# Patient Record
Sex: MALE | Race: WHITE | Employment: FULL TIME | URBAN - METROPOLITAN AREA
[De-identification: names, ages, dates, MRNs, and addresses within clinical notes are randomized per-mention and may not be internally consistent; named-entity substitution may affect disease eponyms.]

---

## 2017-01-01 DIAGNOSIS — E78.2 MIXED HYPERLIPIDEMIA: ICD-10-CM

## 2017-01-01 DIAGNOSIS — J30.89 PERENNIAL ALLERGIC RHINITIS: ICD-10-CM

## 2017-01-03 DIAGNOSIS — E11.9 TYPE 2 DIABETES MELLITUS WITHOUT COMPLICATION, WITHOUT LONG-TERM CURRENT USE OF INSULIN (HCC): Primary | ICD-10-CM

## 2017-01-03 RX ORDER — FLUTICASONE PROPIONATE 50 MCG
SPRAY, SUSPENSION (ML) NASAL
Qty: 48 G | Refills: 3 | Status: SHIPPED | OUTPATIENT
Start: 2017-01-03 | End: 2018-01-14 | Stop reason: SDUPTHER

## 2017-01-03 RX ORDER — PRAVASTATIN SODIUM 80 MG/1
TABLET ORAL
Qty: 90 TABLET | Refills: 3 | Status: SHIPPED | OUTPATIENT
Start: 2017-01-03 | End: 2017-12-20 | Stop reason: SDUPTHER

## 2017-02-05 ENCOUNTER — TELEPHONE (OUTPATIENT)
Dept: FAMILY MEDICINE CLINIC | Age: 62
End: 2017-02-05

## 2017-02-19 DIAGNOSIS — E11.9 TYPE 2 DIABETES MELLITUS WITHOUT COMPLICATION (HCC): ICD-10-CM

## 2017-03-10 ENCOUNTER — OFFICE VISIT (OUTPATIENT)
Dept: FAMILY MEDICINE CLINIC | Age: 62
End: 2017-03-10

## 2017-03-10 VITALS
BODY MASS INDEX: 23.91 KG/M2 | HEIGHT: 70 IN | DIASTOLIC BLOOD PRESSURE: 62 MMHG | RESPIRATION RATE: 12 BRPM | WEIGHT: 167 LBS | HEART RATE: 96 BPM | TEMPERATURE: 98.4 F | SYSTOLIC BLOOD PRESSURE: 114 MMHG

## 2017-03-10 DIAGNOSIS — E11.9 TYPE 2 DIABETES MELLITUS WITHOUT COMPLICATION, WITHOUT LONG-TERM CURRENT USE OF INSULIN (HCC): Primary | ICD-10-CM

## 2017-03-10 DIAGNOSIS — E11.9 TYPE 2 DIABETES MELLITUS WITHOUT COMPLICATION, WITHOUT LONG-TERM CURRENT USE OF INSULIN (HCC): ICD-10-CM

## 2017-03-10 DIAGNOSIS — I10 ESSENTIAL HYPERTENSION, BENIGN: ICD-10-CM

## 2017-03-10 DIAGNOSIS — M15.9 PRIMARY OSTEOARTHRITIS INVOLVING MULTIPLE JOINTS: ICD-10-CM

## 2017-03-10 DIAGNOSIS — E78.2 MIXED HYPERLIPIDEMIA: ICD-10-CM

## 2017-03-10 LAB
ALBUMIN SERPL-MCNC: 4.5 G/DL (ref 3.9–4.9)
ALP BLD-CCNC: 53 U/L (ref 35–104)
ALT SERPL-CCNC: 20 U/L (ref 0–41)
ANION GAP SERPL CALCULATED.3IONS-SCNC: 15 MEQ/L (ref 7–13)
AST SERPL-CCNC: 11 U/L (ref 0–40)
BASOPHILS ABSOLUTE: 0.2 K/UL (ref 0–0.2)
BASOPHILS RELATIVE PERCENT: 2.8 %
BILIRUB SERPL-MCNC: 0.4 MG/DL (ref 0–1.2)
BUN BLDV-MCNC: 19 MG/DL (ref 6–20)
CALCIUM SERPL-MCNC: 10 MG/DL (ref 8.6–10.2)
CHLORIDE BLD-SCNC: 93 MEQ/L (ref 98–107)
CHOLESTEROL, TOTAL: 174 MG/DL (ref 0–199)
CO2: 22 MEQ/L (ref 22–29)
CREAT SERPL-MCNC: 0.7 MG/DL (ref 0.7–1.2)
CREATININE URINE: 128.4 MG/DL
EOSINOPHILS ABSOLUTE: 0.3 K/UL (ref 0–0.7)
EOSINOPHILS RELATIVE PERCENT: 5.4 %
GFR AFRICAN AMERICAN: >60
GFR NON-AFRICAN AMERICAN: >60
GLOBULIN: 2.2 G/DL (ref 2.3–3.5)
GLUCOSE BLD-MCNC: 321 MG/DL (ref 74–109)
HBA1C MFR BLD: 10.1 % (ref 4.8–5.9)
HCT VFR BLD CALC: 42.2 % (ref 42–52)
HDLC SERPL-MCNC: 36 MG/DL (ref 40–59)
HEMOGLOBIN: 14.5 G/DL (ref 14–18)
LDL CHOLESTEROL CALCULATED: 96 MG/DL (ref 0–129)
LYMPHOCYTES ABSOLUTE: 1.5 K/UL (ref 1–4.8)
LYMPHOCYTES RELATIVE PERCENT: 28.6 %
MCH RBC QN AUTO: 31.1 PG (ref 27–31.3)
MCHC RBC AUTO-ENTMCNC: 34.2 % (ref 33–37)
MCV RBC AUTO: 90.7 FL (ref 80–100)
MICROALBUMIN UR-MCNC: 2 MG/DL
MICROALBUMIN/CREAT UR-RTO: 15.6 MG/G (ref 0–30)
MONOCYTES ABSOLUTE: 0.4 K/UL (ref 0.2–0.8)
MONOCYTES RELATIVE PERCENT: 7.6 %
NEUTROPHILS ABSOLUTE: 3 K/UL (ref 1.4–6.5)
NEUTROPHILS RELATIVE PERCENT: 55.6 %
PDW BLD-RTO: 12.9 % (ref 11.5–14.5)
PLATELET # BLD: 261 K/UL (ref 130–400)
POTASSIUM SERPL-SCNC: 5.1 MEQ/L (ref 3.5–5.1)
RBC # BLD: 4.66 M/UL (ref 4.7–6.1)
SODIUM BLD-SCNC: 130 MEQ/L (ref 132–144)
TOTAL PROTEIN: 6.7 G/DL (ref 6.4–8.1)
TRIGL SERPL-MCNC: 211 MG/DL (ref 0–200)
WBC # BLD: 5.4 K/UL (ref 4.8–10.8)

## 2017-03-10 PROCEDURE — 99214 OFFICE O/P EST MOD 30 MIN: CPT | Performed by: FAMILY MEDICINE

## 2017-03-10 RX ORDER — LISINOPRIL 20 MG/1
20 TABLET ORAL DAILY
Qty: 90 TABLET | Refills: 3 | Status: SHIPPED | OUTPATIENT
Start: 2017-03-10 | End: 2017-08-14 | Stop reason: SDUPTHER

## 2017-03-10 ASSESSMENT — ENCOUNTER SYMPTOMS
CHEST TIGHTNESS: 0
DIARRHEA: 0
BLOOD IN STOOL: 0
VOMITING: 0
ABDOMINAL PAIN: 0
CONSTIPATION: 0
COUGH: 0
TROUBLE SWALLOWING: 0
NAUSEA: 0
SHORTNESS OF BREATH: 0

## 2017-03-16 ENCOUNTER — OFFICE VISIT (OUTPATIENT)
Dept: FAMILY MEDICINE CLINIC | Age: 62
End: 2017-03-16

## 2017-03-16 VITALS
HEART RATE: 96 BPM | WEIGHT: 167 LBS | BODY MASS INDEX: 23.91 KG/M2 | HEIGHT: 70 IN | TEMPERATURE: 98 F | SYSTOLIC BLOOD PRESSURE: 114 MMHG | DIASTOLIC BLOOD PRESSURE: 66 MMHG | RESPIRATION RATE: 18 BRPM

## 2017-03-16 PROCEDURE — 99213 OFFICE O/P EST LOW 20 MIN: CPT | Performed by: FAMILY MEDICINE

## 2017-03-16 RX ORDER — GLIMEPIRIDE 4 MG/1
4 TABLET ORAL EVERY MORNING
Qty: 90 TABLET | Refills: 3 | Status: SHIPPED | OUTPATIENT
Start: 2017-03-16 | End: 2017-10-30 | Stop reason: SDUPTHER

## 2017-06-06 DIAGNOSIS — E29.1 SECONDARY MALE HYPOGONADISM: ICD-10-CM

## 2017-07-07 DIAGNOSIS — E78.2 MIXED HYPERLIPIDEMIA: Primary | ICD-10-CM

## 2017-07-07 DIAGNOSIS — I10 ESSENTIAL HYPERTENSION, BENIGN: ICD-10-CM

## 2017-07-08 DIAGNOSIS — I10 ESSENTIAL HYPERTENSION, BENIGN: ICD-10-CM

## 2017-07-08 DIAGNOSIS — E78.2 MIXED HYPERLIPIDEMIA: ICD-10-CM

## 2017-07-08 LAB
ALBUMIN SERPL-MCNC: 4.5 G/DL (ref 3.9–4.9)
ALP BLD-CCNC: 50 U/L (ref 35–104)
ALT SERPL-CCNC: 34 U/L (ref 0–41)
ANION GAP SERPL CALCULATED.3IONS-SCNC: 15 MEQ/L (ref 7–13)
AST SERPL-CCNC: 20 U/L (ref 0–40)
BASOPHILS ABSOLUTE: 0.1 K/UL (ref 0–0.2)
BASOPHILS RELATIVE PERCENT: 2.3 %
BILIRUB SERPL-MCNC: 0.3 MG/DL (ref 0–1.2)
BUN BLDV-MCNC: 15 MG/DL (ref 6–20)
CALCIUM SERPL-MCNC: 9.7 MG/DL (ref 8.6–10.2)
CHLORIDE BLD-SCNC: 96 MEQ/L (ref 98–107)
CHOLESTEROL, TOTAL: 161 MG/DL (ref 0–199)
CO2: 24 MEQ/L (ref 22–29)
CREAT SERPL-MCNC: 0.58 MG/DL (ref 0.7–1.2)
EOSINOPHILS ABSOLUTE: 0.3 K/UL (ref 0–0.7)
EOSINOPHILS RELATIVE PERCENT: 6.3 %
GFR AFRICAN AMERICAN: >60
GFR NON-AFRICAN AMERICAN: >60
GLOBULIN: 2.3 G/DL (ref 2.3–3.5)
GLUCOSE BLD-MCNC: 129 MG/DL (ref 74–109)
HBA1C MFR BLD: 8.4 % (ref 4.8–5.9)
HCT VFR BLD CALC: 43.4 % (ref 42–52)
HDLC SERPL-MCNC: 44 MG/DL (ref 40–59)
HEMOGLOBIN: 14.8 G/DL (ref 14–18)
LDL CHOLESTEROL CALCULATED: 91 MG/DL (ref 0–129)
LYMPHOCYTES ABSOLUTE: 1.5 K/UL (ref 1–4.8)
LYMPHOCYTES RELATIVE PERCENT: 34.9 %
MCH RBC QN AUTO: 31 PG (ref 27–31.3)
MCHC RBC AUTO-ENTMCNC: 34 % (ref 33–37)
MCV RBC AUTO: 91.2 FL (ref 80–100)
MONOCYTES ABSOLUTE: 0.6 K/UL (ref 0.2–0.8)
MONOCYTES RELATIVE PERCENT: 13 %
NEUTROPHILS ABSOLUTE: 1.9 K/UL (ref 1.4–6.5)
NEUTROPHILS RELATIVE PERCENT: 43.5 %
PDW BLD-RTO: 13.1 % (ref 11.5–14.5)
PLATELET # BLD: 203 K/UL (ref 130–400)
POTASSIUM SERPL-SCNC: 4.3 MEQ/L (ref 3.5–5.1)
RBC # BLD: 4.76 M/UL (ref 4.7–6.1)
SODIUM BLD-SCNC: 135 MEQ/L (ref 132–144)
TOTAL PROTEIN: 6.8 G/DL (ref 6.4–8.1)
TRIGL SERPL-MCNC: 128 MG/DL (ref 0–200)
WBC # BLD: 4.4 K/UL (ref 4.8–10.8)

## 2017-07-10 ENCOUNTER — OFFICE VISIT (OUTPATIENT)
Dept: FAMILY MEDICINE CLINIC | Age: 62
End: 2017-07-10

## 2017-07-10 VITALS
TEMPERATURE: 98.7 F | HEIGHT: 70 IN | HEART RATE: 84 BPM | SYSTOLIC BLOOD PRESSURE: 114 MMHG | BODY MASS INDEX: 25.34 KG/M2 | DIASTOLIC BLOOD PRESSURE: 64 MMHG | RESPIRATION RATE: 16 BRPM | WEIGHT: 177 LBS

## 2017-07-10 DIAGNOSIS — R53.83 OTHER MALAISE AND FATIGUE: ICD-10-CM

## 2017-07-10 DIAGNOSIS — R53.81 OTHER MALAISE AND FATIGUE: ICD-10-CM

## 2017-07-10 DIAGNOSIS — I10 ESSENTIAL HYPERTENSION, BENIGN: ICD-10-CM

## 2017-07-10 DIAGNOSIS — E78.2 MIXED HYPERLIPIDEMIA: ICD-10-CM

## 2017-07-10 PROCEDURE — 99214 OFFICE O/P EST MOD 30 MIN: CPT | Performed by: FAMILY MEDICINE

## 2017-07-10 PROCEDURE — 96372 THER/PROPH/DIAG INJ SC/IM: CPT | Performed by: FAMILY MEDICINE

## 2017-07-10 RX ORDER — CYANOCOBALAMIN 1000 UG/ML
1000 INJECTION INTRAMUSCULAR; SUBCUTANEOUS ONCE
Status: COMPLETED | OUTPATIENT
Start: 2017-07-10 | End: 2017-07-10

## 2017-07-10 RX ADMIN — CYANOCOBALAMIN 1000 MCG: 1000 INJECTION INTRAMUSCULAR; SUBCUTANEOUS at 18:45

## 2017-07-16 ASSESSMENT — ENCOUNTER SYMPTOMS
CONSTIPATION: 0
COUGH: 0
TROUBLE SWALLOWING: 0
BLOOD IN STOOL: 0
SHORTNESS OF BREATH: 0
CHEST TIGHTNESS: 0
VOMITING: 0
NAUSEA: 0
ABDOMINAL PAIN: 0
DIARRHEA: 0

## 2017-08-12 DIAGNOSIS — I10 ESSENTIAL HYPERTENSION, BENIGN: ICD-10-CM

## 2017-08-14 RX ORDER — LISINOPRIL 20 MG/1
TABLET ORAL
Qty: 90 TABLET | Refills: 3 | OUTPATIENT
Start: 2017-08-14

## 2017-08-14 RX ORDER — LISINOPRIL 20 MG/1
TABLET ORAL
Qty: 90 TABLET | Refills: 3 | Status: SHIPPED | OUTPATIENT
Start: 2017-08-14 | End: 2018-07-10 | Stop reason: SDUPTHER

## 2017-08-14 RX ORDER — HYDROCODONE BITARTRATE AND ACETAMINOPHEN 5; 325 MG/1; MG/1
TABLET ORAL
Refills: 0 | COMMUNITY
Start: 2017-07-20

## 2017-10-31 RX ORDER — GLIMEPIRIDE 4 MG/1
4 TABLET ORAL EVERY MORNING
Qty: 90 TABLET | Refills: 3 | Status: SHIPPED | OUTPATIENT
Start: 2017-10-31 | End: 2017-11-13 | Stop reason: SDUPTHER

## 2017-11-10 DIAGNOSIS — E78.2 MIXED HYPERLIPIDEMIA: Primary | ICD-10-CM

## 2017-11-10 DIAGNOSIS — R53.81 MALAISE AND FATIGUE: ICD-10-CM

## 2017-11-10 DIAGNOSIS — I10 ESSENTIAL HYPERTENSION, BENIGN: ICD-10-CM

## 2017-11-10 DIAGNOSIS — E78.2 MIXED HYPERLIPIDEMIA: ICD-10-CM

## 2017-11-10 DIAGNOSIS — E29.1 SECONDARY MALE HYPOGONADISM: ICD-10-CM

## 2017-11-10 DIAGNOSIS — R53.83 MALAISE AND FATIGUE: ICD-10-CM

## 2017-11-10 LAB
ALBUMIN SERPL-MCNC: 4.8 G/DL (ref 3.9–4.9)
ALP BLD-CCNC: 44 U/L (ref 35–104)
ALT SERPL-CCNC: 27 U/L (ref 0–41)
ANION GAP SERPL CALCULATED.3IONS-SCNC: 22 MEQ/L (ref 7–13)
AST SERPL-CCNC: 16 U/L (ref 0–40)
BASOPHILS ABSOLUTE: 0.1 K/UL (ref 0–0.2)
BASOPHILS RELATIVE PERCENT: 1.4 %
BILIRUB SERPL-MCNC: 0.3 MG/DL (ref 0–1.2)
BUN BLDV-MCNC: 14 MG/DL (ref 8–23)
CALCIUM SERPL-MCNC: 9.6 MG/DL (ref 8.6–10.2)
CHLORIDE BLD-SCNC: 96 MEQ/L (ref 98–107)
CHOLESTEROL, TOTAL: 169 MG/DL (ref 0–199)
CO2: 22 MEQ/L (ref 22–29)
CREAT SERPL-MCNC: 0.83 MG/DL (ref 0.7–1.2)
CREATININE URINE: 368.4 MG/DL
EOSINOPHILS ABSOLUTE: 0.3 K/UL (ref 0–0.7)
EOSINOPHILS RELATIVE PERCENT: 4.1 %
GFR AFRICAN AMERICAN: >60
GFR NON-AFRICAN AMERICAN: >60
GLOBULIN: 2.4 G/DL (ref 2.3–3.5)
GLUCOSE BLD-MCNC: 100 MG/DL (ref 74–109)
HBA1C MFR BLD: 8.8 % (ref 4.8–5.9)
HCT VFR BLD CALC: 43.5 % (ref 42–52)
HDLC SERPL-MCNC: 43 MG/DL (ref 40–59)
HEMOGLOBIN: 14.6 G/DL (ref 14–18)
LDL CHOLESTEROL CALCULATED: 93 MG/DL (ref 0–129)
LYMPHOCYTES ABSOLUTE: 1.9 K/UL (ref 1–4.8)
LYMPHOCYTES RELATIVE PERCENT: 28.1 %
MCH RBC QN AUTO: 31.5 PG (ref 27–31.3)
MCHC RBC AUTO-ENTMCNC: 33.7 % (ref 33–37)
MCV RBC AUTO: 93.6 FL (ref 80–100)
MICROALBUMIN UR-MCNC: 6.5 MG/DL
MICROALBUMIN/CREAT UR-RTO: 17.6 MG/G (ref 0–30)
MONOCYTES ABSOLUTE: 0.6 K/UL (ref 0.2–0.8)
MONOCYTES RELATIVE PERCENT: 9.2 %
NEUTROPHILS ABSOLUTE: 3.9 K/UL (ref 1.4–6.5)
NEUTROPHILS RELATIVE PERCENT: 57.2 %
PDW BLD-RTO: 13.4 % (ref 11.5–14.5)
PLATELET # BLD: 237 K/UL (ref 130–400)
POTASSIUM SERPL-SCNC: 4.6 MEQ/L (ref 3.5–5.1)
RBC # BLD: 4.64 M/UL (ref 4.7–6.1)
SODIUM BLD-SCNC: 140 MEQ/L (ref 132–144)
TOTAL PROTEIN: 7.2 G/DL (ref 6.4–8.1)
TRIGL SERPL-MCNC: 163 MG/DL (ref 0–200)
TSH SERPL DL<=0.05 MIU/L-ACNC: 1.14 UIU/ML (ref 0.27–4.2)
WBC # BLD: 6.8 K/UL (ref 4.8–10.8)

## 2017-11-13 ENCOUNTER — OFFICE VISIT (OUTPATIENT)
Dept: FAMILY MEDICINE CLINIC | Age: 62
End: 2017-11-13

## 2017-11-13 VITALS
HEART RATE: 80 BPM | WEIGHT: 178 LBS | DIASTOLIC BLOOD PRESSURE: 66 MMHG | BODY MASS INDEX: 25.48 KG/M2 | HEIGHT: 70 IN | RESPIRATION RATE: 14 BRPM | SYSTOLIC BLOOD PRESSURE: 124 MMHG | TEMPERATURE: 97.1 F

## 2017-11-13 DIAGNOSIS — I10 ESSENTIAL HYPERTENSION, BENIGN: ICD-10-CM

## 2017-11-13 DIAGNOSIS — E78.2 MIXED HYPERLIPIDEMIA: ICD-10-CM

## 2017-11-13 DIAGNOSIS — E29.1 SECONDARY MALE HYPOGONADISM: ICD-10-CM

## 2017-11-13 LAB — TESTOSTERONE TOTAL-MALE: 241 NG/DL (ref 300–720)

## 2017-11-13 PROCEDURE — 99214 OFFICE O/P EST MOD 30 MIN: CPT | Performed by: FAMILY MEDICINE

## 2017-11-13 RX ORDER — GLIMEPIRIDE 4 MG/1
8 TABLET ORAL EVERY MORNING
Qty: 180 TABLET | Refills: 3 | Status: SHIPPED | OUTPATIENT
Start: 2017-11-13 | End: 2018-03-13 | Stop reason: SDUPTHER

## 2017-11-13 RX ORDER — TESTOSTERONE 30 MG/1.5ML
SOLUTION TOPICAL
Qty: 1 BOTTLE | Refills: 5 | Status: SHIPPED | OUTPATIENT
Start: 2017-11-13 | End: 2018-06-15 | Stop reason: SDUPTHER

## 2017-11-13 ASSESSMENT — PATIENT HEALTH QUESTIONNAIRE - PHQ9
SUM OF ALL RESPONSES TO PHQ9 QUESTIONS 1 & 2: 0
2. FEELING DOWN, DEPRESSED OR HOPELESS: 0
SUM OF ALL RESPONSES TO PHQ QUESTIONS 1-9: 0
1. LITTLE INTEREST OR PLEASURE IN DOING THINGS: 0

## 2017-11-15 ASSESSMENT — ENCOUNTER SYMPTOMS
BLOOD IN STOOL: 0
NAUSEA: 0
SHORTNESS OF BREATH: 0
CHEST TIGHTNESS: 0
ABDOMINAL PAIN: 0
CONSTIPATION: 0
DIARRHEA: 0
TROUBLE SWALLOWING: 0
COUGH: 0
VOMITING: 0

## 2017-11-21 ENCOUNTER — TELEPHONE (OUTPATIENT)
Dept: FAMILY MEDICINE CLINIC | Age: 62
End: 2017-11-21

## 2017-11-28 RX ORDER — BACLOFEN 10 MG/1
TABLET ORAL
Qty: 180 TABLET | Refills: 3 | Status: SHIPPED | OUTPATIENT
Start: 2017-11-28 | End: 2018-07-10 | Stop reason: SDUPTHER

## 2017-11-28 NOTE — TELEPHONE ENCOUNTER
Pharmacy requests refill on medication. Please approve or deny this request.  Last seen by you on 11/13/17.     Future Appointments  Date Time Provider Stanton Jaramillo   3/9/2018 9:00 AM SCHEDULE, LAB [facility] PCP [facility]   3/13/2018 4:15 PM Bib Car  [address]

## 2017-12-20 DIAGNOSIS — E78.2 MIXED HYPERLIPIDEMIA: ICD-10-CM

## 2017-12-20 RX ORDER — PRAVASTATIN SODIUM 80 MG/1
TABLET ORAL
Qty: 90 TABLET | Refills: 3 | Status: SHIPPED | OUTPATIENT
Start: 2017-12-20 | End: 2018-07-10 | Stop reason: SDUPTHER

## 2017-12-20 NOTE — TELEPHONE ENCOUNTER
PHARMACY REQUESTING REFILL  PATIENT LAST SEEN 11/13/17  LAST REFILL 1/3/17  PLEASE APPROVE OR DENY.        Future Appointments  Date Time Provider Stanton Jaramillo   3/9/2018 9:00 AM SCHEDULE, LAB [facility] SIFUENTES PCP [facility]   3/13/2018 4:15 PM Nickie Roldan  [address]

## 2018-01-11 ENCOUNTER — TELEPHONE (OUTPATIENT)
Dept: FAMILY MEDICINE CLINIC | Age: 63
End: 2018-01-11

## 2018-01-14 DIAGNOSIS — J30.89 PERENNIAL ALLERGIC RHINITIS: ICD-10-CM

## 2018-01-14 RX ORDER — FLUTICASONE PROPIONATE 50 MCG
SPRAY, SUSPENSION (ML) NASAL
Qty: 48 G | Refills: 3 | Status: SHIPPED | OUTPATIENT
Start: 2018-01-14 | End: 2018-07-10 | Stop reason: SDUPTHER

## 2018-02-07 DIAGNOSIS — E11.9 TYPE 2 DIABETES MELLITUS WITHOUT COMPLICATION (HCC): ICD-10-CM

## 2018-03-10 DIAGNOSIS — E78.2 MIXED HYPERLIPIDEMIA: ICD-10-CM

## 2018-03-10 DIAGNOSIS — I10 ESSENTIAL HYPERTENSION, BENIGN: ICD-10-CM

## 2018-03-10 DIAGNOSIS — E29.1 SECONDARY MALE HYPOGONADISM: ICD-10-CM

## 2018-03-10 LAB
ALBUMIN SERPL-MCNC: 4.9 G/DL (ref 3.9–4.9)
ALP BLD-CCNC: 50 U/L (ref 35–104)
ALT SERPL-CCNC: 24 U/L (ref 0–41)
ANION GAP SERPL CALCULATED.3IONS-SCNC: 19 MEQ/L (ref 7–13)
AST SERPL-CCNC: 17 U/L (ref 0–40)
BASOPHILS ABSOLUTE: 0.1 K/UL (ref 0–0.2)
BASOPHILS RELATIVE PERCENT: 1.5 %
BILIRUB SERPL-MCNC: 0.4 MG/DL (ref 0–1.2)
BUN BLDV-MCNC: 13 MG/DL (ref 8–23)
CALCIUM SERPL-MCNC: 10.3 MG/DL (ref 8.6–10.2)
CHLORIDE BLD-SCNC: 94 MEQ/L (ref 98–107)
CHOLESTEROL, TOTAL: 161 MG/DL (ref 0–199)
CO2: 24 MEQ/L (ref 22–29)
CREAT SERPL-MCNC: 0.76 MG/DL (ref 0.7–1.2)
EOSINOPHILS ABSOLUTE: 0.4 K/UL (ref 0–0.7)
EOSINOPHILS RELATIVE PERCENT: 6.4 %
GFR AFRICAN AMERICAN: >60
GFR NON-AFRICAN AMERICAN: >60
GLOBULIN: 2.5 G/DL (ref 2.3–3.5)
GLUCOSE BLD-MCNC: 193 MG/DL (ref 74–109)
HBA1C MFR BLD: 11.2 % (ref 4.8–5.9)
HCT VFR BLD CALC: 45.4 % (ref 42–52)
HDLC SERPL-MCNC: 49 MG/DL (ref 40–59)
HEMOGLOBIN: 15.3 G/DL (ref 14–18)
LDL CHOLESTEROL CALCULATED: 89 MG/DL (ref 0–129)
LYMPHOCYTES ABSOLUTE: 1.6 K/UL (ref 1–4.8)
LYMPHOCYTES RELATIVE PERCENT: 28.6 %
MCH RBC QN AUTO: 31.2 PG (ref 27–31.3)
MCHC RBC AUTO-ENTMCNC: 33.6 % (ref 33–37)
MCV RBC AUTO: 92.8 FL (ref 80–100)
MONOCYTES ABSOLUTE: 0.7 K/UL (ref 0.2–0.8)
MONOCYTES RELATIVE PERCENT: 12.6 %
NEUTROPHILS ABSOLUTE: 2.9 K/UL (ref 1.4–6.5)
NEUTROPHILS RELATIVE PERCENT: 50.9 %
PDW BLD-RTO: 13 % (ref 11.5–14.5)
PLATELET # BLD: 232 K/UL (ref 130–400)
POTASSIUM SERPL-SCNC: 4.6 MEQ/L (ref 3.5–5.1)
RBC # BLD: 4.89 M/UL (ref 4.7–6.1)
SODIUM BLD-SCNC: 137 MEQ/L (ref 132–144)
TOTAL PROTEIN: 7.4 G/DL (ref 6.4–8.1)
TRIGL SERPL-MCNC: 113 MG/DL (ref 0–200)
WBC # BLD: 5.7 K/UL (ref 4.8–10.8)

## 2018-03-12 LAB — TESTOSTERONE TOTAL-MALE: 246 NG/DL (ref 300–720)

## 2018-03-13 ENCOUNTER — OFFICE VISIT (OUTPATIENT)
Dept: FAMILY MEDICINE CLINIC | Age: 63
End: 2018-03-13
Payer: COMMERCIAL

## 2018-03-13 VITALS
HEIGHT: 70 IN | RESPIRATION RATE: 12 BRPM | SYSTOLIC BLOOD PRESSURE: 112 MMHG | WEIGHT: 164.4 LBS | HEART RATE: 67 BPM | DIASTOLIC BLOOD PRESSURE: 78 MMHG | TEMPERATURE: 98.3 F | BODY MASS INDEX: 23.54 KG/M2

## 2018-03-13 DIAGNOSIS — I10 ESSENTIAL HYPERTENSION, BENIGN: ICD-10-CM

## 2018-03-13 DIAGNOSIS — E78.2 MIXED HYPERLIPIDEMIA: ICD-10-CM

## 2018-03-13 PROCEDURE — 99214 OFFICE O/P EST MOD 30 MIN: CPT | Performed by: FAMILY MEDICINE

## 2018-03-13 RX ORDER — GLIMEPIRIDE 4 MG/1
8 TABLET ORAL EVERY MORNING
Qty: 180 TABLET | Refills: 3 | Status: SHIPPED | OUTPATIENT
Start: 2018-03-13 | End: 2019-02-10 | Stop reason: SDUPTHER

## 2018-03-13 RX ORDER — GABAPENTIN 300 MG/1
CAPSULE ORAL
Refills: 0 | COMMUNITY
Start: 2018-03-09

## 2018-03-13 ASSESSMENT — ENCOUNTER SYMPTOMS
VOMITING: 0
CONSTIPATION: 0
TROUBLE SWALLOWING: 0
ABDOMINAL PAIN: 0
NAUSEA: 0
SHORTNESS OF BREATH: 0
COUGH: 0
BLOOD IN STOOL: 0
BACK PAIN: 0
CHEST TIGHTNESS: 0
DIARRHEA: 0

## 2018-03-13 NOTE — PROGRESS NOTES
Diabetes Mellitus Type 2: Current symptoms/problems include some neuropathy. Home blood sugar records: patient tests 2 time(s) per week  Any episodes of hypoglycemia? no  Known diabetic complications: none  Hypertension has been well-controlled on low salt diet. Hyperlipidemia is well controlled with current diet and activity. Disc disease of the neck may be candidate for surgery although patient advised to put that off until he can take the pain no longer. Blood pressure less than 131/81,   BP Readings from Last 1 Encounters:   03/13/18 112/78       Social history: This pt is not a smoker. This pt does not take an aspirin a day. Last eye exam was 12/2016  Last diabetic foot exam was 7/2017   Lab results for chronic conditions:    GFR Non- (no units)   Date Value   03/10/2018 >60.0   11/10/2017 >60.0   07/08/2017 >60.0     No results found for: GFR  Cholesterol, Total (mg/dL)   Date Value   03/10/2018 161   11/10/2017 169   07/08/2017 161     Triglycerides (mg/dL)   Date Value   03/10/2018 113   11/10/2017 163   07/08/2017 128     HDL   Date Value   03/10/2018 49 mg/dL   11/10/2017 43 mg/dL   07/08/2017 44 mg/dL   04/19/2011 58 mg/dl     LDL Calculated (mg/dL)   Date Value   03/10/2018 89   11/10/2017 93   07/08/2017 91     TSH (uIU/mL)   Date Value   11/10/2017 1.140     Hemoglobin A1C (%)   Date Value   03/10/2018 11.2 (H)   11/10/2017 8.8 (H)   07/08/2017 8.4 (H)     Microalbumin, Random Urine (mg/dL)   Date Value   11/10/2017 6.50 (H)     Review of Systems   HENT: Negative for congestion and trouble swallowing. Respiratory: Negative for cough, chest tightness and shortness of breath. Cardiovascular: Negative for chest pain, palpitations and leg swelling. Gastrointestinal: Negative for abdominal pain, blood in stool, constipation, diarrhea, nausea and vomiting. Endocrine: Negative for cold intolerance and heat intolerance. Musculoskeletal: Positive for neck pain.  Negative

## 2018-06-15 DIAGNOSIS — E78.2 MIXED HYPERLIPIDEMIA: Primary | ICD-10-CM

## 2018-06-15 DIAGNOSIS — E29.1 SECONDARY MALE HYPOGONADISM: ICD-10-CM

## 2018-06-16 DIAGNOSIS — E78.2 MIXED HYPERLIPIDEMIA: ICD-10-CM

## 2018-06-16 LAB
ALBUMIN SERPL-MCNC: 4.7 G/DL (ref 3.9–4.9)
ALP BLD-CCNC: 48 U/L (ref 35–104)
ALT SERPL-CCNC: 23 U/L (ref 0–41)
ANION GAP SERPL CALCULATED.3IONS-SCNC: 15 MEQ/L (ref 7–13)
AST SERPL-CCNC: 17 U/L (ref 0–40)
BASOPHILS ABSOLUTE: 0.1 K/UL (ref 0–0.2)
BASOPHILS RELATIVE PERCENT: 1.3 %
BILIRUB SERPL-MCNC: 0.3 MG/DL (ref 0–1.2)
BUN BLDV-MCNC: 13 MG/DL (ref 8–23)
CALCIUM SERPL-MCNC: 9.8 MG/DL (ref 8.6–10.2)
CHLORIDE BLD-SCNC: 95 MEQ/L (ref 98–107)
CHOLESTEROL, TOTAL: 160 MG/DL (ref 0–199)
CO2: 26 MEQ/L (ref 22–29)
CREAT SERPL-MCNC: 0.71 MG/DL (ref 0.7–1.2)
EOSINOPHILS ABSOLUTE: 0.4 K/UL (ref 0–0.7)
EOSINOPHILS RELATIVE PERCENT: 6.3 %
GFR AFRICAN AMERICAN: >60
GFR NON-AFRICAN AMERICAN: >60
GLOBULIN: 2.5 G/DL (ref 2.3–3.5)
GLUCOSE BLD-MCNC: 160 MG/DL (ref 74–109)
HBA1C MFR BLD: 10.3 % (ref 4.8–5.9)
HCT VFR BLD CALC: 44.5 % (ref 42–52)
HDLC SERPL-MCNC: 47 MG/DL (ref 40–59)
HEMOGLOBIN: 15.1 G/DL (ref 14–18)
LDL CHOLESTEROL CALCULATED: 84 MG/DL (ref 0–129)
LYMPHOCYTES ABSOLUTE: 1.9 K/UL (ref 1–4.8)
LYMPHOCYTES RELATIVE PERCENT: 32.1 %
MCH RBC QN AUTO: 31.8 PG (ref 27–31.3)
MCHC RBC AUTO-ENTMCNC: 34 % (ref 33–37)
MCV RBC AUTO: 93.6 FL (ref 80–100)
MONOCYTES ABSOLUTE: 0.7 K/UL (ref 0.2–0.8)
MONOCYTES RELATIVE PERCENT: 11.4 %
NEUTROPHILS ABSOLUTE: 2.9 K/UL (ref 1.4–6.5)
NEUTROPHILS RELATIVE PERCENT: 48.9 %
PDW BLD-RTO: 13.5 % (ref 11.5–14.5)
PLATELET # BLD: 262 K/UL (ref 130–400)
POTASSIUM SERPL-SCNC: 4.9 MEQ/L (ref 3.5–5.1)
RBC # BLD: 4.76 M/UL (ref 4.7–6.1)
SODIUM BLD-SCNC: 136 MEQ/L (ref 132–144)
TOTAL PROTEIN: 7.2 G/DL (ref 6.4–8.1)
TRIGL SERPL-MCNC: 143 MG/DL (ref 0–200)
WBC # BLD: 6 K/UL (ref 4.8–10.8)

## 2018-06-18 RX ORDER — TESTOSTERONE 30 MG/1.5ML
SOLUTION TOPICAL
Qty: 1 BOTTLE | Refills: 5 | Status: SHIPPED | OUTPATIENT
Start: 2018-06-18 | End: 2018-10-18 | Stop reason: SDUPTHER

## 2018-07-10 ENCOUNTER — OFFICE VISIT (OUTPATIENT)
Dept: FAMILY MEDICINE CLINIC | Age: 63
End: 2018-07-10
Payer: COMMERCIAL

## 2018-07-10 VITALS
TEMPERATURE: 98.7 F | BODY MASS INDEX: 24.98 KG/M2 | DIASTOLIC BLOOD PRESSURE: 86 MMHG | RESPIRATION RATE: 12 BRPM | SYSTOLIC BLOOD PRESSURE: 122 MMHG | HEIGHT: 70 IN | HEART RATE: 74 BPM | WEIGHT: 174.5 LBS | OXYGEN SATURATION: 94 %

## 2018-07-10 DIAGNOSIS — I10 ESSENTIAL HYPERTENSION, BENIGN: ICD-10-CM

## 2018-07-10 DIAGNOSIS — E78.2 MIXED HYPERLIPIDEMIA: ICD-10-CM

## 2018-07-10 DIAGNOSIS — M15.9 PRIMARY OSTEOARTHRITIS INVOLVING MULTIPLE JOINTS: ICD-10-CM

## 2018-07-10 DIAGNOSIS — J30.89 PERENNIAL ALLERGIC RHINITIS: ICD-10-CM

## 2018-07-10 DIAGNOSIS — E29.1 SECONDARY MALE HYPOGONADISM: ICD-10-CM

## 2018-07-10 PROCEDURE — 99214 OFFICE O/P EST MOD 30 MIN: CPT | Performed by: FAMILY MEDICINE

## 2018-07-10 RX ORDER — PRAVASTATIN SODIUM 80 MG/1
TABLET ORAL
Qty: 90 TABLET | Refills: 3 | Status: SHIPPED | OUTPATIENT
Start: 2018-07-10 | End: 2019-03-06

## 2018-07-10 RX ORDER — FLUTICASONE PROPIONATE 50 MCG
SPRAY, SUSPENSION (ML) NASAL
Qty: 48 G | Refills: 3 | Status: SHIPPED | OUTPATIENT
Start: 2018-07-10 | End: 2019-03-29

## 2018-07-10 RX ORDER — BACLOFEN 10 MG/1
TABLET ORAL
Qty: 180 TABLET | Refills: 3 | Status: SHIPPED | OUTPATIENT
Start: 2018-07-10

## 2018-07-10 RX ORDER — LISINOPRIL 20 MG/1
TABLET ORAL
Qty: 90 TABLET | Refills: 3 | Status: SHIPPED | OUTPATIENT
Start: 2018-07-10 | End: 2018-12-21 | Stop reason: SDUPTHER

## 2018-07-10 ASSESSMENT — PATIENT HEALTH QUESTIONNAIRE - PHQ9
1. LITTLE INTEREST OR PLEASURE IN DOING THINGS: 0
SUM OF ALL RESPONSES TO PHQ QUESTIONS 1-9: 0
SUM OF ALL RESPONSES TO PHQ9 QUESTIONS 1 & 2: 0
2. FEELING DOWN, DEPRESSED OR HOPELESS: 0

## 2018-07-10 NOTE — PROGRESS NOTES
Diabetes Mellitus Type 2: Current symptoms/problems include none and neuropathy. Home blood sugar records: patient tests 2 time(s) per week  Any episodes of hypoglycemia? no  Known diabetic complications: none other than poor control. Hypertension has been well-controlled with current treatments which include lisinopril 20 mg daily. Hyperlipidemia with good numbers on medium intensity statin, pravastatin 80 mg daily. We will need to discuss transitioning to a high intensity statin. Allergic rhinitis is stable with current treatment. Hypogonadism is also stable with current treatment. We will continue topical testosterone. Blood pressure less than 131/81,   BP Readings from Last 1 Encounters:   07/10/18 122/86       Social history: This pt is not a smoker. This pt does not take an aspirin a day. Last eye exam was 12/2016  Last diabetic foot exam was 7/2017   Lab results for chronic conditions:    GFR Non- (no units)   Date Value   06/16/2018 >60.0   03/10/2018 >60.0   11/10/2017 >60.0     No results found for: GFR  Cholesterol, Total (mg/dL)   Date Value   06/16/2018 160   03/10/2018 161   11/10/2017 169     Triglycerides (mg/dL)   Date Value   06/16/2018 143   03/10/2018 113   11/10/2017 163     HDL   Date Value   06/16/2018 47 mg/dL   03/10/2018 49 mg/dL   11/10/2017 43 mg/dL   04/19/2011 58 mg/dl     LDL Calculated (mg/dL)   Date Value   06/16/2018 84   03/10/2018 89   11/10/2017 93     TSH (uIU/mL)   Date Value   11/10/2017 1.140     Hemoglobin A1C (%)   Date Value   06/16/2018 10.3 (H)   03/10/2018 11.2 (H)   11/10/2017 8.8 (H)     Microalbumin, Random Urine (mg/dL)   Date Value   11/10/2017 6.50 (H)       Review of Systems   HENT: Negative for congestion and trouble swallowing. Respiratory: Negative for cough, chest tightness and shortness of breath. Cardiovascular: Negative for chest pain, palpitations and leg swelling.    Gastrointestinal: Negative for abdominal pain, blood in stool, constipation, diarrhea, nausea and vomiting. Endocrine: Negative for cold intolerance and heat intolerance. Neurological: Negative for dizziness and light-headedness. Psychiatric/Behavioral: Negative for confusion. The patient is not nervous/anxious. Diabetes Counseling   Patient was counseled regarding disease risks and adopting healthy behaviors. Patient was provided education materials to assist with self management. Patient was provided log (or received log during previous visit) to record blood pressure, food intake and/or blood sugar. Patient was instructed to keep log up-to-date and to always bring log to all office visits. EXAM:  Constitutional Blood pressure 122/86, pulse 74, temperature 98.7 °F (37.1 °C), temperature source Temporal, resp. rate 12, height 5' 9.5\" (1.765 m), weight 174 lb 8 oz (79.2 kg), SpO2 94 %. Physical Exam   Constitutional: He is oriented to person, place, and time. He appears well-developed and well-nourished. Neck: Normal range of motion. Neck supple. Carotid bruit is not present. Cardiovascular: Normal rate, regular rhythm and normal heart sounds. Pulmonary/Chest: Effort normal and breath sounds normal.   Abdominal: Soft. There is no tenderness. There is no rebound and no guarding. Musculoskeletal:   There is no costovertebral angle tenderness. Lumbar spine and sacroiliac joints are non tender. There is no edema in the four extremities. Pulses palpable at both posterior tibial and radial arteries. Neurological: He is alert and oriented to person, place, and time. DIAGNOSIS:    Diagnosis Orders   1. Uncontrolled type 2 diabetes mellitus without complication, without long-term current use of insulin (HCC)  Hemoglobin A1C    DISCONTINUED: Empagliflozin-Linagliptin 25-5 MG TABS    Poorly controlled, need to add medication and improve diet.    2. Essential hypertension, benign  lisinopril (PRINIVIL;ZESTRIL) 20 MG tablet    CBC Auto Differential    Comprehensive Metabolic Panel    Well controlled, continue current medications. 3. Mixed hyperlipidemia  pravastatin (PRAVACHOL) 80 MG tablet    Lipid Panel    Well controlled, continue current medications. 4. Perennial allergic rhinitis  fluticasone (FLONASE) 50 MCG/ACT nasal spray    Well controlled, continue current medications. 5. Secondary male hypogonadism  Testosterone male    Well controlled, continue same medications. 6. Primary osteoarthritis involving multiple joints  baclofen (LIORESAL) 10 MG tablet    Pain is present and well controlled, patient following with pain management. Plan for follow up: Follow up in 3 months with blood work as ordered. Other follow up as needed.       Electronically signed by Michael Riley, 10:24 PM 7/14/18

## 2018-07-11 ENCOUNTER — TELEPHONE (OUTPATIENT)
Dept: FAMILY MEDICINE CLINIC | Age: 63
End: 2018-07-11

## 2018-07-14 ASSESSMENT — ENCOUNTER SYMPTOMS
TROUBLE SWALLOWING: 0
NAUSEA: 0
CONSTIPATION: 0
COUGH: 0
VOMITING: 0
SHORTNESS OF BREATH: 0
CHEST TIGHTNESS: 0
BLOOD IN STOOL: 0
ABDOMINAL PAIN: 0
DIARRHEA: 0

## 2018-10-06 DIAGNOSIS — E29.1 SECONDARY MALE HYPOGONADISM: ICD-10-CM

## 2018-10-06 DIAGNOSIS — E78.2 MIXED HYPERLIPIDEMIA: ICD-10-CM

## 2018-10-06 DIAGNOSIS — I10 ESSENTIAL HYPERTENSION, BENIGN: ICD-10-CM

## 2018-10-06 LAB
ALBUMIN SERPL-MCNC: 4.5 G/DL (ref 3.9–4.9)
ALP BLD-CCNC: 45 U/L (ref 35–104)
ALT SERPL-CCNC: 19 U/L (ref 0–41)
ANION GAP SERPL CALCULATED.3IONS-SCNC: 15 MEQ/L (ref 7–13)
AST SERPL-CCNC: 15 U/L (ref 0–40)
BASOPHILS ABSOLUTE: 0.1 K/UL (ref 0–0.2)
BASOPHILS RELATIVE PERCENT: 1.9 %
BILIRUB SERPL-MCNC: <0.2 MG/DL (ref 0–1.2)
BUN BLDV-MCNC: 14 MG/DL (ref 8–23)
CALCIUM SERPL-MCNC: 9.7 MG/DL (ref 8.6–10.2)
CHLORIDE BLD-SCNC: 97 MEQ/L (ref 98–107)
CHOLESTEROL, TOTAL: 159 MG/DL (ref 0–199)
CO2: 24 MEQ/L (ref 22–29)
CREAT SERPL-MCNC: 0.84 MG/DL (ref 0.7–1.2)
EOSINOPHILS ABSOLUTE: 0 K/UL (ref 0–0.7)
EOSINOPHILS RELATIVE PERCENT: 0 %
GFR AFRICAN AMERICAN: >60
GFR NON-AFRICAN AMERICAN: >60
GLOBULIN: 2.4 G/DL (ref 2.3–3.5)
GLUCOSE BLD-MCNC: 117 MG/DL (ref 74–109)
HBA1C MFR BLD: 10.7 % (ref 4.8–5.9)
HCT VFR BLD CALC: 43.3 % (ref 42–52)
HDLC SERPL-MCNC: 47 MG/DL (ref 40–59)
HEMOGLOBIN: 14.9 G/DL (ref 14–18)
LDL CHOLESTEROL CALCULATED: 91 MG/DL (ref 0–129)
LYMPHOCYTES ABSOLUTE: 1.3 K/UL (ref 1–4.8)
LYMPHOCYTES RELATIVE PERCENT: 22.9 %
MCH RBC QN AUTO: 32.3 PG (ref 27–31.3)
MCHC RBC AUTO-ENTMCNC: 34.5 % (ref 33–37)
MCV RBC AUTO: 93.7 FL (ref 80–100)
MONOCYTES ABSOLUTE: 0.6 K/UL (ref 0.2–0.8)
MONOCYTES RELATIVE PERCENT: 10.4 %
NEUTROPHILS ABSOLUTE: 3.6 K/UL (ref 1.4–6.5)
NEUTROPHILS RELATIVE PERCENT: 64.8 %
PDW BLD-RTO: 13.4 % (ref 11.5–14.5)
PLATELET # BLD: 208 K/UL (ref 130–400)
POTASSIUM SERPL-SCNC: 4.4 MEQ/L (ref 3.5–5.1)
RBC # BLD: 4.62 M/UL (ref 4.7–6.1)
SODIUM BLD-SCNC: 136 MEQ/L (ref 132–144)
TOTAL PROTEIN: 6.9 G/DL (ref 6.4–8.1)
TRIGL SERPL-MCNC: 107 MG/DL (ref 0–200)
WBC # BLD: 5.5 K/UL (ref 4.8–10.8)

## 2018-10-09 LAB — TESTOSTERONE TOTAL-MALE: 412 NG/DL (ref 300–720)

## 2018-10-18 ENCOUNTER — OFFICE VISIT (OUTPATIENT)
Dept: FAMILY MEDICINE CLINIC | Age: 63
End: 2018-10-18
Payer: COMMERCIAL

## 2018-10-18 VITALS
SYSTOLIC BLOOD PRESSURE: 110 MMHG | HEART RATE: 73 BPM | TEMPERATURE: 97.7 F | BODY MASS INDEX: 27.31 KG/M2 | WEIGHT: 174 LBS | OXYGEN SATURATION: 93 % | HEIGHT: 67 IN | DIASTOLIC BLOOD PRESSURE: 70 MMHG | RESPIRATION RATE: 14 BRPM

## 2018-10-18 DIAGNOSIS — I10 ESSENTIAL HYPERTENSION, BENIGN: ICD-10-CM

## 2018-10-18 DIAGNOSIS — E78.2 MIXED HYPERLIPIDEMIA: ICD-10-CM

## 2018-10-18 DIAGNOSIS — E29.1 SECONDARY MALE HYPOGONADISM: ICD-10-CM

## 2018-10-18 PROCEDURE — 99214 OFFICE O/P EST MOD 30 MIN: CPT | Performed by: FAMILY MEDICINE

## 2018-10-18 RX ORDER — TESTOSTERONE 30 MG/1.5ML
45 SOLUTION TOPICAL DAILY
Qty: 1 BOTTLE | Refills: 5 | Status: SHIPPED | OUTPATIENT
Start: 2018-10-18 | End: 2019-04-16

## 2018-10-18 RX ORDER — PIOGLITAZONEHYDROCHLORIDE 30 MG/1
30 TABLET ORAL DAILY
Qty: 90 TABLET | Refills: 3 | Status: SHIPPED | OUTPATIENT
Start: 2018-10-18 | End: 2018-12-21 | Stop reason: SDUPTHER

## 2018-10-18 ASSESSMENT — PATIENT HEALTH QUESTIONNAIRE - PHQ9
1. LITTLE INTEREST OR PLEASURE IN DOING THINGS: 0
SUM OF ALL RESPONSES TO PHQ9 QUESTIONS 1 & 2: 0
2. FEELING DOWN, DEPRESSED OR HOPELESS: 0
SUM OF ALL RESPONSES TO PHQ QUESTIONS 1-9: 0
SUM OF ALL RESPONSES TO PHQ QUESTIONS 1-9: 0

## 2018-10-23 LAB — DIABETIC RETINOPATHY: NEGATIVE

## 2018-10-27 ASSESSMENT — ENCOUNTER SYMPTOMS
CONSTIPATION: 0
DIARRHEA: 0
VOMITING: 0
COUGH: 0
BLOOD IN STOOL: 0
NAUSEA: 0
CHEST TIGHTNESS: 0
ABDOMINAL PAIN: 0
SHORTNESS OF BREATH: 0

## 2018-11-26 ENCOUNTER — OFFICE VISIT (OUTPATIENT)
Dept: FAMILY MEDICINE CLINIC | Age: 63
End: 2018-11-26
Payer: COMMERCIAL

## 2018-11-26 VITALS
HEIGHT: 67 IN | OXYGEN SATURATION: 95 % | DIASTOLIC BLOOD PRESSURE: 70 MMHG | TEMPERATURE: 97.7 F | HEART RATE: 78 BPM | WEIGHT: 174.3 LBS | RESPIRATION RATE: 14 BRPM | SYSTOLIC BLOOD PRESSURE: 122 MMHG | BODY MASS INDEX: 27.36 KG/M2

## 2018-11-26 DIAGNOSIS — M50.90 CERVICAL DISC DISEASE: Primary | ICD-10-CM

## 2018-11-26 DIAGNOSIS — E78.2 MIXED HYPERLIPIDEMIA: ICD-10-CM

## 2018-11-26 DIAGNOSIS — I10 ESSENTIAL HYPERTENSION, BENIGN: ICD-10-CM

## 2018-11-26 DIAGNOSIS — E29.1 SECONDARY MALE HYPOGONADISM: ICD-10-CM

## 2018-11-26 DIAGNOSIS — M15.9 PRIMARY OSTEOARTHRITIS INVOLVING MULTIPLE JOINTS: ICD-10-CM

## 2018-11-26 DIAGNOSIS — Z01.818 PREOPERATIVE CLEARANCE: ICD-10-CM

## 2018-11-26 LAB
ALBUMIN SERPL-MCNC: 4.7 G/DL (ref 3.9–4.9)
ALP BLD-CCNC: 38 U/L (ref 35–104)
ALT SERPL-CCNC: 22 U/L (ref 0–41)
ANION GAP SERPL CALCULATED.3IONS-SCNC: 18 MEQ/L (ref 7–13)
APTT: 26.7 SEC (ref 21.6–35.4)
AST SERPL-CCNC: 16 U/L (ref 0–40)
BASOPHILS ABSOLUTE: 0.1 K/UL (ref 0–0.2)
BASOPHILS RELATIVE PERCENT: 1.3 %
BILIRUB SERPL-MCNC: 0.3 MG/DL (ref 0–1.2)
BILIRUBIN URINE: NEGATIVE
BLOOD, URINE: NEGATIVE
BUN BLDV-MCNC: 19 MG/DL (ref 8–23)
CALCIUM SERPL-MCNC: 9.8 MG/DL (ref 8.6–10.2)
CHLORIDE BLD-SCNC: 93 MEQ/L (ref 98–107)
CLARITY: CLEAR
CO2: 23 MEQ/L (ref 22–29)
COLOR: YELLOW
CREAT SERPL-MCNC: 0.74 MG/DL (ref 0.7–1.2)
EOSINOPHILS ABSOLUTE: 0 K/UL (ref 0–0.7)
EOSINOPHILS RELATIVE PERCENT: 0 %
GFR AFRICAN AMERICAN: >60
GFR NON-AFRICAN AMERICAN: >60
GLOBULIN: 2.8 G/DL (ref 2.3–3.5)
GLUCOSE BLD-MCNC: 214 MG/DL (ref 74–109)
GLUCOSE URINE: >=1000 MG/DL
HBA1C MFR BLD: 9.8 % (ref 4.8–5.9)
HCT VFR BLD CALC: 42.5 % (ref 42–52)
HEMOGLOBIN: 14.6 G/DL (ref 14–18)
INR BLD: 1
KETONES, URINE: ABNORMAL MG/DL
LEUKOCYTE ESTERASE, URINE: NEGATIVE
LYMPHOCYTES ABSOLUTE: 1.6 K/UL (ref 1–4.8)
LYMPHOCYTES RELATIVE PERCENT: 29.6 %
MCH RBC QN AUTO: 31.8 PG (ref 27–31.3)
MCHC RBC AUTO-ENTMCNC: 34.3 % (ref 33–37)
MCV RBC AUTO: 92.6 FL (ref 80–100)
MONOCYTES ABSOLUTE: 0.6 K/UL (ref 0.2–0.8)
MONOCYTES RELATIVE PERCENT: 11.1 %
NEUTROPHILS ABSOLUTE: 3.1 K/UL (ref 1.4–6.5)
NEUTROPHILS RELATIVE PERCENT: 58 %
NITRITE, URINE: NEGATIVE
PDW BLD-RTO: 13.2 % (ref 11.5–14.5)
PH UA: 5 (ref 5–9)
PLATELET # BLD: 206 K/UL (ref 130–400)
POTASSIUM SERPL-SCNC: 4.6 MEQ/L (ref 3.5–5.1)
PROTEIN UA: NEGATIVE MG/DL
PROTHROMBIN TIME: 10 SEC (ref 9.6–12.3)
RBC # BLD: 4.59 M/UL (ref 4.7–6.1)
SODIUM BLD-SCNC: 134 MEQ/L (ref 132–144)
SPECIFIC GRAVITY UA: 1.04 (ref 1–1.03)
TOTAL PROTEIN: 7.5 G/DL (ref 6.4–8.1)
UROBILINOGEN, URINE: 1 E.U./DL
WBC # BLD: 5.3 K/UL (ref 4.8–10.8)

## 2018-11-26 PROCEDURE — 93000 ELECTROCARDIOGRAM COMPLETE: CPT | Performed by: FAMILY MEDICINE

## 2018-11-26 PROCEDURE — 99243 OFF/OP CNSLTJ NEW/EST LOW 30: CPT | Performed by: FAMILY MEDICINE

## 2018-11-26 ASSESSMENT — PATIENT HEALTH QUESTIONNAIRE - PHQ9
SUM OF ALL RESPONSES TO PHQ QUESTIONS 1-9: 0
1. LITTLE INTEREST OR PLEASURE IN DOING THINGS: 0
2. FEELING DOWN, DEPRESSED OR HOPELESS: 0
SUM OF ALL RESPONSES TO PHQ QUESTIONS 1-9: 0
SUM OF ALL RESPONSES TO PHQ9 QUESTIONS 1 & 2: 0

## 2018-11-26 NOTE — PROGRESS NOTES
(PRAVACHOL) 80 MG tablet TAKE 1 TABLET DAILY 90 tablet 3    baclofen (LIORESAL) 10 MG tablet TAKE 1 TABLET TWICE A DAY  AS NEEDED 180 tablet 3    gabapentin (NEURONTIN) 300 MG capsule TK ONE C PO TID UTD  0    glimepiride (AMARYL) 4 MG tablet Take 2 tablets by mouth every morning 180 tablet 3    metFORMIN (GLUCOPHAGE) 1000 MG tablet TAKE 1 TABLET TWICE A DAY  WITH MEALS 180 tablet 3    HYDROcodone-acetaminophen (NORCO) 5-325 MG per tablet TK 1 T PO QD PRF PAIN  0    Diclofenac Sodium  MG TB24 TK 1 T PO QD WF  5    glucose blood VI test strips (ONE TOUCH ULTRA TEST) strip Test three times daily  Dx: E11.65 300 each 3    ONETOUCH DELICA LANCETS FINE MISC Test three times daily DX: E11.65 300 each 3    cetirizine ([employer] INDOOR/OUTDOOR ALLERGY RLF) 10 MG tablet Take 1 tablet by mouth daily 90 tablet 3    Blood Glucose Monitoring Suppl (ONE TOUCH ULTRA 2) W/DEVICE KIT Test once daily prn 1 kit 0    penciclovir (DENAVIR) 1 % cream Apply every 3 hours to affected skin 4 times daily 12 g 5     Current Facility-Administered Medications   Medication Dose Route Frequency Provider Last Rate Last Dose    testosterone cypionate (DEPOTESTOTERONE CYPIONATE) injection 50 mg  50 mg Intramuscular Once Vignesh Frye MD         No Known Allergies    Social History     Social History    Marital status:      Spouse name: N/A    Number of children: N/A    Years of education: N/A     Social History Main Topics    Smoking status: Former Smoker     Packs/day: 0.50     Years: 24.00     Quit date: 9/21/1993    Smokeless tobacco: Never Used    Alcohol use Yes      Comment: rarely    Drug use: Unknown    Sexual activity: Not Asked     Other Topics Concern    None     Social History Narrative    None     Family History   Problem Relation Age of Onset    Cancer Mother         colon    Other Mother         benign brain tumor    Cancer Father         bladder/lung       Review of Systems - REVIEW OF SYSTEMS: four extremities. Pulses palpable at both posterior tibial and radial arteries. Neurological: He is alert and oriented to person, place, and time. Skin: Skin is warm and dry. No rash noted. Psychiatric: He has a normal mood and affect. His behavior is normal.     The Ekg interpretation:     normal sinus rhythm with a rate of 82  Axis is   Normal  QTc is  normal  Intervals and Durations are unremarkable. No specific ST-T wave changes appreciated. No evidence of acute ischemia. No significant change from prior EKG dated 2/17/17. DIAGNOSIS:    Diagnosis Orders   1. Cervical disc disease      Symptomatic and failing all conservative treatments, surgery necessary. 2. Preoperative clearance  EKG 12 lead unit performed    APTT    Comprehensive Metabolic Panel    CBC Auto Differential    Protime-INR    Urinalysis    Acceptable low risk for proposed surgery   3. Uncontrolled diabetes mellitus type 2 without complications ([address]  Hemoglobin A1C    Poorly controlled, adjust treatment. Increase pioglitazone 30 mg to 1-1/2 tablets daily. Check A1c with pre-op lab work. 4. Mixed hyperlipidemia      Well controlled, continue current treatment. 5. Essential hypertension      Well controlled, continue current treatment. 6. Primary osteoarthritis involving multiple joints      Stable, continue current treatment, evaluate hips after recovery from neck. 7. Secondary male hypogonadism      Well controlled, continue current treatment. Plan for follow up: Follow up in early postoperative period with blood work as ordered today. Other follow up as needed.       Electronically signed by Sadie Nj, 9:33 AM 11/26/18

## 2018-11-28 ENCOUNTER — TELEPHONE (OUTPATIENT)
Dept: FAMILY MEDICINE CLINIC | Age: 63
End: 2018-11-28

## 2018-12-18 LAB — HBA1C MFR BLD: 10 % (ref 4–6)

## 2018-12-21 ENCOUNTER — OFFICE VISIT (OUTPATIENT)
Dept: FAMILY MEDICINE CLINIC | Age: 63
End: 2018-12-21
Payer: COMMERCIAL

## 2018-12-21 VITALS
HEIGHT: 69 IN | BODY MASS INDEX: 27.55 KG/M2 | SYSTOLIC BLOOD PRESSURE: 134 MMHG | WEIGHT: 186 LBS | TEMPERATURE: 98.2 F | DIASTOLIC BLOOD PRESSURE: 82 MMHG | RESPIRATION RATE: 14 BRPM | OXYGEN SATURATION: 96 % | HEART RATE: 91 BPM

## 2018-12-21 DIAGNOSIS — E11.9 TYPE 2 DIABETES MELLITUS WITHOUT COMPLICATION, WITHOUT LONG-TERM CURRENT USE OF INSULIN (HCC): ICD-10-CM

## 2018-12-21 DIAGNOSIS — I10 ESSENTIAL HYPERTENSION, BENIGN: ICD-10-CM

## 2018-12-21 PROCEDURE — 99213 OFFICE O/P EST LOW 20 MIN: CPT | Performed by: FAMILY MEDICINE

## 2018-12-21 RX ORDER — LISINOPRIL 20 MG/1
TABLET ORAL
Qty: 90 TABLET | Refills: 3 | Status: SHIPPED | OUTPATIENT
Start: 2018-12-21

## 2018-12-21 RX ORDER — PIOGLITAZONEHYDROCHLORIDE 45 MG/1
45 TABLET ORAL DAILY
Qty: 90 TABLET | Refills: 3 | Status: SHIPPED | OUTPATIENT
Start: 2018-12-21

## 2018-12-21 ASSESSMENT — PATIENT HEALTH QUESTIONNAIRE - PHQ9
1. LITTLE INTEREST OR PLEASURE IN DOING THINGS: 0
2. FEELING DOWN, DEPRESSED OR HOPELESS: 0
SUM OF ALL RESPONSES TO PHQ QUESTIONS 1-9: 0
SUM OF ALL RESPONSES TO PHQ QUESTIONS 1-9: 0
SUM OF ALL RESPONSES TO PHQ9 QUESTIONS 1 & 2: 0

## 2018-12-21 NOTE — PROGRESS NOTES
Diabetes Mellitus Type 2: Current symptoms/problems include none. Home blood sugar records: fasting range: 130-143  Any episodes of hypoglycemia? no  Known diabetic complications: none. The patient had neck surgery and was given a very small amount of insulin. In the hospital they checked his A1c and was 10.0%. Review of Systems   Endocrine: Negative for polydipsia and polyphagia. Diabetes Counseling   Patient was counseled regarding disease risks and adopting healthy behaviors. Patient was provided education materials to assist with self management. Patient was provided log (or received log during previous visit) to record blood pressure, food intake and/or blood sugar. Patient was instructed to keep log up-to-date and to always bring log to all office visits. EXAM:  Constitutional Blood pressure 134/82, pulse 91, temperature 98.2 °F (36.8 °C), temperature source Temporal, resp. rate 14, height 5' 9\" (1.753 m), weight 186 lb (84.4 kg), SpO2 96 %. Physical Exam   Constitutional: He is oriented to person, place, and time. He appears well-developed and well-nourished. Neck:   Surgical scars appear well healing, patient is wearing a brace to immobilize the night. Cardiovascular: Normal rate, regular rhythm and normal heart sounds. Pulmonary/Chest: Effort normal and breath sounds normal.   Neurological: He is oriented to person, place, and time. DIAGNOSIS:    Diagnosis Orders   1. Essential hypertension, benign  lisinopril (PRINIVIL;ZESTRIL) 20 MG tablet    Well controlled, continue current medications. 2. Uncontrolled diabetes mellitus type 2 without complications (HCC)  pioglitazone (ACTOS) 45 MG tablet    Poor control, add pioglitazone and improved diet. 3. Type 2 diabetes mellitus without complication, without long-term current use of insulin (HCC)  metFORMIN (GLUCOPHAGE) 1000 MG tablet     Plan for follow up:  Follow up in 2 months with blood work to include only a point of care test hemoglobin A1c on arrival.  Other follow up as needed.       Electronically signed by Christianne Gee, 10:53 AM 12/21/18

## 2018-12-27 ENCOUNTER — TELEPHONE (OUTPATIENT)
Dept: FAMILY MEDICINE CLINIC | Age: 63
End: 2018-12-27

## 2018-12-27 NOTE — TELEPHONE ENCOUNTER
PT CALLING STATES THAT HE HAD CT SCAN 12/24/18 WHILE IN THE HOSPITAL. HE HAS SINCE BEEN OFF METFORMIN. PT WANTS TO KNOW WHEN HE SHOULD RESTART METFORMIN.      PLEASE ADVISE

## 2019-01-04 ENCOUNTER — OFFICE VISIT (OUTPATIENT)
Dept: FAMILY MEDICINE CLINIC | Age: 64
End: 2019-01-04
Payer: COMMERCIAL

## 2019-01-04 VITALS
HEART RATE: 103 BPM | SYSTOLIC BLOOD PRESSURE: 102 MMHG | OXYGEN SATURATION: 94 % | BODY MASS INDEX: 20.83 KG/M2 | TEMPERATURE: 97.5 F | DIASTOLIC BLOOD PRESSURE: 76 MMHG | HEIGHT: 69 IN | WEIGHT: 140.6 LBS | RESPIRATION RATE: 16 BRPM

## 2019-01-04 DIAGNOSIS — M50.00 CERVICAL DISC DISEASE WITH MYELOPATHY: ICD-10-CM

## 2019-01-04 DIAGNOSIS — I10 ESSENTIAL HYPERTENSION, BENIGN: ICD-10-CM

## 2019-01-04 DIAGNOSIS — E29.1 SECONDARY MALE HYPOGONADISM: ICD-10-CM

## 2019-01-04 DIAGNOSIS — E78.2 MIXED HYPERLIPIDEMIA: ICD-10-CM

## 2019-01-04 DIAGNOSIS — R22.1 THROAT SWELLING: Primary | ICD-10-CM

## 2019-01-04 PROCEDURE — 99495 TRANSJ CARE MGMT MOD F2F 14D: CPT | Performed by: FAMILY MEDICINE

## 2019-01-04 PROCEDURE — 1111F DSCHRG MED/CURRENT MED MERGE: CPT | Performed by: FAMILY MEDICINE

## 2019-01-04 ASSESSMENT — PATIENT HEALTH QUESTIONNAIRE - PHQ9
1. LITTLE INTEREST OR PLEASURE IN DOING THINGS: 0
2. FEELING DOWN, DEPRESSED OR HOPELESS: 0
SUM OF ALL RESPONSES TO PHQ9 QUESTIONS 1 & 2: 0
SUM OF ALL RESPONSES TO PHQ QUESTIONS 1-9: 0
SUM OF ALL RESPONSES TO PHQ QUESTIONS 1-9: 0

## 2019-01-14 DIAGNOSIS — E11.9 TYPE 2 DIABETES MELLITUS WITHOUT COMPLICATION (HCC): ICD-10-CM

## 2019-01-14 RX ORDER — SITAGLIPTIN 100 MG/1
TABLET, FILM COATED ORAL
Qty: 90 TABLET | Refills: 3 | OUTPATIENT
Start: 2019-01-14

## 2019-01-25 RX ORDER — GLIMEPIRIDE 4 MG/1
TABLET ORAL
Qty: 180 TABLET | Refills: 3 | OUTPATIENT
Start: 2019-01-25

## 2019-01-29 ENCOUNTER — TELEPHONE (OUTPATIENT)
Dept: FAMILY MEDICINE CLINIC | Age: 64
End: 2019-01-29

## 2019-02-01 ENCOUNTER — TELEPHONE (OUTPATIENT)
Dept: FAMILY MEDICINE CLINIC | Age: 64
End: 2019-02-01

## 2019-02-01 NOTE — TELEPHONE ENCOUNTER
From: Jason Taylor  To: Kojo Dueñas MD  Sent: 2/1/2019 5:16 PM EST  Subject: Medication Renewal Request    Hi,    I spoke to [facility]. The [facility] representative indicated that because you said that I do not use an insulin pump, they denied the request. Obviously, I do not use an insulin pump. .. I have not been able to check my blood sugar for 2 weeks. I had a spinal fusion in December. This is a major problem. Please determine what should be done to resolve this problem. Thank you,  BELLE Rios  ----- Message -----  From: Tawnya Bean  Sent: 1/25/2019 8:16 AM EST  To: Tri Arevalo [place]  Subject: RE: Medication Renewal Request  Adrianne Sargent,    I apologize for the delay. We had not received the fax from [facility] to begin a prior authorization. I contacted them today and they are faxing the information so we can process your request.    Thank you,  Nathen Kidd     ----- Message -----   From: Tri Rios   Sent: 1/24/2019 6:54 PM EST   To: Remedios Mason MD  Subject: Medication Renewal Request    Tri Baltazar. Charlette Rios would like a refill of the following medications:     blood glucose test strips (ONE TOUCH ULTRA TEST) strip Remedios Mason MD]   Patient Comment: [facility] called you for prior authorization of my test strips. I have been out of test strips for a week. This is a problem given the fact that I just had a spinal fusion. Blood sugar impacts healing. Please contact [facility] to authorize my test strips.     Preferred pharmacy: [facility] [address] [place] [address]

## 2019-02-03 NOTE — TELEPHONE ENCOUNTER
The prescription for the test strips is printed if you could please sign it and then/urinary troubles and fax that to the pharmacy will see if we can get him doing the testing twice a day. If this does not work we'll have to test once a day.

## 2019-02-06 RX ORDER — BLOOD-GLUCOSE METER
EACH MISCELLANEOUS
Qty: 1 KIT | Refills: 0 | Status: SHIPPED | OUTPATIENT
Start: 2019-02-06

## 2019-02-06 RX ORDER — LANCETS 30 GAUGE
EACH MISCELLANEOUS
Qty: 200 EACH | Refills: 3 | Status: SHIPPED | OUTPATIENT
Start: 2019-02-06

## 2019-02-11 RX ORDER — GLIMEPIRIDE 4 MG/1
TABLET ORAL
Qty: 180 TABLET | Refills: 3 | Status: SHIPPED | OUTPATIENT
Start: 2019-02-11

## 2019-02-25 ENCOUNTER — OFFICE VISIT (OUTPATIENT)
Dept: FAMILY MEDICINE CLINIC | Age: 64
End: 2019-02-25
Payer: COMMERCIAL

## 2019-02-25 VITALS
RESPIRATION RATE: 14 BRPM | TEMPERATURE: 97.6 F | DIASTOLIC BLOOD PRESSURE: 82 MMHG | SYSTOLIC BLOOD PRESSURE: 104 MMHG | HEART RATE: 85 BPM | BODY MASS INDEX: 22.01 KG/M2 | HEIGHT: 69 IN | OXYGEN SATURATION: 95 % | WEIGHT: 148.6 LBS

## 2019-02-25 DIAGNOSIS — E29.1 HYPOGONADISM, MALE: ICD-10-CM

## 2019-02-25 DIAGNOSIS — E78.2 MIXED HYPERLIPIDEMIA: ICD-10-CM

## 2019-02-25 DIAGNOSIS — E11.9 TYPE 2 DIABETES MELLITUS WITHOUT COMPLICATION, WITHOUT LONG-TERM CURRENT USE OF INSULIN (HCC): Primary | ICD-10-CM

## 2019-02-25 DIAGNOSIS — I10 ESSENTIAL HYPERTENSION, BENIGN: ICD-10-CM

## 2019-02-25 LAB — HBA1C MFR BLD: 6.5 %

## 2019-02-25 PROCEDURE — 83036 HEMOGLOBIN GLYCOSYLATED A1C: CPT | Performed by: FAMILY MEDICINE

## 2019-02-25 PROCEDURE — 99214 OFFICE O/P EST MOD 30 MIN: CPT | Performed by: FAMILY MEDICINE

## 2019-02-25 ASSESSMENT — PATIENT HEALTH QUESTIONNAIRE - PHQ9
SUM OF ALL RESPONSES TO PHQ9 QUESTIONS 1 & 2: 0
SUM OF ALL RESPONSES TO PHQ QUESTIONS 1-9: 0
SUM OF ALL RESPONSES TO PHQ QUESTIONS 1-9: 0
1. LITTLE INTEREST OR PLEASURE IN DOING THINGS: 0
2. FEELING DOWN, DEPRESSED OR HOPELESS: 0

## 2019-03-02 ASSESSMENT — ENCOUNTER SYMPTOMS
NAUSEA: 0
DIARRHEA: 0
BLOOD IN STOOL: 0
VOMITING: 0
CONSTIPATION: 0
SHORTNESS OF BREATH: 0
ABDOMINAL PAIN: 0
COUGH: 0
CHEST TIGHTNESS: 0

## 2019-03-05 DIAGNOSIS — E78.2 MIXED HYPERLIPIDEMIA: ICD-10-CM

## 2019-03-06 RX ORDER — PRAVASTATIN SODIUM 80 MG/1
TABLET ORAL
Qty: 90 TABLET | Refills: 3 | Status: SHIPPED | OUTPATIENT
Start: 2019-03-06

## 2019-03-29 DIAGNOSIS — J30.89 PERENNIAL ALLERGIC RHINITIS: ICD-10-CM

## 2019-03-29 RX ORDER — FLUTICASONE PROPIONATE 50 MCG
SPRAY, SUSPENSION (ML) NASAL
Qty: 48 G | Refills: 3 | Status: SHIPPED | OUTPATIENT
Start: 2019-03-29

## 2023-03-02 LAB
ALANINE AMINOTRANSFERASE (SGPT) (U/L) IN SER/PLAS: 15 U/L (ref 10–52)
ALBUMIN (G/DL) IN SER/PLAS: 4.9 G/DL (ref 3.4–5)
ALKALINE PHOSPHATASE (U/L) IN SER/PLAS: 41 U/L (ref 33–136)
ANION GAP IN SER/PLAS: 13 MMOL/L (ref 10–20)
ASPARTATE AMINOTRANSFERASE (SGOT) (U/L) IN SER/PLAS: 18 U/L (ref 9–39)
BASOPHILS (10*3/UL) IN BLOOD BY AUTOMATED COUNT: 0.11 X10E9/L (ref 0–0.1)
BASOPHILS/100 LEUKOCYTES IN BLOOD BY AUTOMATED COUNT: 2.3 % (ref 0–2)
BILIRUBIN TOTAL (MG/DL) IN SER/PLAS: 0.4 MG/DL (ref 0–1.2)
CALCIUM (MG/DL) IN SER/PLAS: 10 MG/DL (ref 8.6–10.3)
CARBON DIOXIDE, TOTAL (MMOL/L) IN SER/PLAS: 29 MMOL/L (ref 21–32)
CHLORIDE (MMOL/L) IN SER/PLAS: 95 MMOL/L (ref 98–107)
CHOLESTEROL (MG/DL) IN SER/PLAS: 211 MG/DL (ref 0–199)
CHOLESTEROL IN HDL (MG/DL) IN SER/PLAS: 83.2 MG/DL
CHOLESTEROL/HDL RATIO: 2.5
CREATININE (MG/DL) IN SER/PLAS: 0.63 MG/DL (ref 0.5–1.3)
EOSINOPHILS (10*3/UL) IN BLOOD BY AUTOMATED COUNT: 0.29 X10E9/L (ref 0–0.7)
EOSINOPHILS/100 LEUKOCYTES IN BLOOD BY AUTOMATED COUNT: 6 % (ref 0–6)
ERYTHROCYTE DISTRIBUTION WIDTH (RATIO) BY AUTOMATED COUNT: 13.2 % (ref 11.5–14.5)
ERYTHROCYTE MEAN CORPUSCULAR HEMOGLOBIN CONCENTRATION (G/DL) BY AUTOMATED: 34.3 G/DL (ref 32–36)
ERYTHROCYTE MEAN CORPUSCULAR VOLUME (FL) BY AUTOMATED COUNT: 93 FL (ref 80–100)
ERYTHROCYTES (10*6/UL) IN BLOOD BY AUTOMATED COUNT: 4.28 X10E12/L (ref 4.5–5.9)
ESTIMATED AVERAGE GLUCOSE FOR HBA1C: 128 MG/DL
GFR MALE: >90 ML/MIN/1.73M2
GLUCOSE (MG/DL) IN SER/PLAS: 121 MG/DL (ref 74–99)
HEMATOCRIT (%) IN BLOOD BY AUTOMATED COUNT: 39.6 % (ref 41–52)
HEMOGLOBIN (G/DL) IN BLOOD: 13.6 G/DL (ref 13.5–17.5)
HEMOGLOBIN A1C/HEMOGLOBIN TOTAL IN BLOOD: 6.1 %
IMMATURE GRANULOCYTES/100 LEUKOCYTES IN BLOOD BY AUTOMATED COUNT: 0.2 % (ref 0–0.9)
LDL: 111 MG/DL (ref 0–99)
LEUKOCYTES (10*3/UL) IN BLOOD BY AUTOMATED COUNT: 4.9 X10E9/L (ref 4.4–11.3)
LYMPHOCYTES (10*3/UL) IN BLOOD BY AUTOMATED COUNT: 1.39 X10E9/L (ref 1.2–4.8)
LYMPHOCYTES/100 LEUKOCYTES IN BLOOD BY AUTOMATED COUNT: 28.6 % (ref 13–44)
MONOCYTES (10*3/UL) IN BLOOD BY AUTOMATED COUNT: 0.5 X10E9/L (ref 0.1–1)
MONOCYTES/100 LEUKOCYTES IN BLOOD BY AUTOMATED COUNT: 10.3 % (ref 2–10)
NEUTROPHILS (10*3/UL) IN BLOOD BY AUTOMATED COUNT: 2.56 X10E9/L (ref 1.2–7.7)
NEUTROPHILS/100 LEUKOCYTES IN BLOOD BY AUTOMATED COUNT: 52.6 % (ref 40–80)
PLATELETS (10*3/UL) IN BLOOD AUTOMATED COUNT: 292 X10E9/L (ref 150–450)
POTASSIUM (MMOL/L) IN SER/PLAS: 4.7 MMOL/L (ref 3.5–5.3)
PROTEIN TOTAL: 7.4 G/DL (ref 6.4–8.2)
SODIUM (MMOL/L) IN SER/PLAS: 132 MMOL/L (ref 136–145)
TRIGLYCERIDE (MG/DL) IN SER/PLAS: 84 MG/DL (ref 0–149)
UREA NITROGEN (MG/DL) IN SER/PLAS: 11 MG/DL (ref 6–23)
VLDL: 17 MG/DL (ref 0–40)

## 2023-03-07 ENCOUNTER — TELEPHONE (OUTPATIENT)
Dept: PRIMARY CARE | Facility: CLINIC | Age: 68
End: 2023-03-07
Payer: COMMERCIAL

## 2023-03-07 DIAGNOSIS — M51.36 DEGENERATIVE DISC DISEASE, LUMBAR: ICD-10-CM

## 2023-03-07 DIAGNOSIS — M54.12 CHRONIC CERVICAL RADICULOPATHY: ICD-10-CM

## 2023-03-07 PROBLEM — M51.369 DEGENERATIVE DISC DISEASE, LUMBAR: Status: ACTIVE | Noted: 2023-03-07

## 2023-03-07 RX ORDER — IBUPROFEN 800 MG/1
800 TABLET ORAL EVERY 8 HOURS PRN
Qty: 180 TABLET | Refills: 5 | Status: SHIPPED | OUTPATIENT
Start: 2023-03-07

## 2023-03-07 RX ORDER — IBUPROFEN 800 MG/1
800 TABLET ORAL EVERY 8 HOURS PRN
COMMUNITY
Start: 2019-05-14 | End: 2023-03-07 | Stop reason: SDUPTHER

## 2023-03-13 LAB
TESTOSTERONE FREE (CHAN): 53.2 PG/ML (ref 35–155)
TESTOSTERONE,TOTAL,LC-MS/MS: 467 NG/DL (ref 250–1100)

## 2023-03-27 DIAGNOSIS — E11.69 TYPE 2 DIABETES MELLITUS WITH OTHER SPECIFIED COMPLICATION, WITHOUT LONG-TERM CURRENT USE OF INSULIN (MULTI): ICD-10-CM

## 2023-03-27 NOTE — TELEPHONE ENCOUNTER
LOV 3/2/2023  NOV 2023    FREESTYLE LITE TEST STRIPS every day    PATIENT STATES HE HAS BEEN OUT OF TEST STRIPS FOR A YEAR AND USING  ONES    PATIENT ALSO QUESTIONING THE ACCURACY OF THE MONITOR BECAUSE GLUCOSE HAS BEEN SHOWING LOW AND HE DOES NOT FEEL THAT IT IS.  CAN HE GET A NEW ONE.    [facility]

## 2023-03-28 RX ORDER — BLOOD-GLUCOSE METER
KIT MISCELLANEOUS
COMMUNITY
End: 2023-03-28 | Stop reason: SDUPTHER

## 2023-03-28 RX ORDER — BLOOD-GLUCOSE METER
KIT MISCELLANEOUS
Qty: 100 STRIP | Refills: 3 | Status: SHIPPED | OUTPATIENT
Start: 2023-03-28 | End: 2023-07-24 | Stop reason: WASHOUT

## 2023-07-17 PROBLEM — K62.1 POLYP OF RECTUM: Status: ACTIVE | Noted: 2023-07-17

## 2023-07-17 PROBLEM — M18.11 OSTEOARTHRITIS OF RIGHT THUMB: Status: ACTIVE | Noted: 2023-07-17

## 2023-07-17 PROBLEM — E11.9 DIABETES (MULTI): Status: ACTIVE | Noted: 2023-07-17

## 2023-07-17 PROBLEM — K76.0 FATTY LIVER: Status: ACTIVE | Noted: 2023-07-17

## 2023-07-17 PROBLEM — R12 HEARTBURN: Status: ACTIVE | Noted: 2023-07-17

## 2023-07-17 PROBLEM — N52.8 OTHER MALE ERECTILE DYSFUNCTION: Status: ACTIVE | Noted: 2023-07-17

## 2023-07-17 PROBLEM — M54.2 NECK PAIN: Status: ACTIVE | Noted: 2023-07-17

## 2023-07-17 PROBLEM — M79.673 FOOT PAIN: Status: ACTIVE | Noted: 2023-07-17

## 2023-07-17 PROBLEM — J30.9 ALLERGIC RHINITIS: Status: ACTIVE | Noted: 2023-07-17

## 2023-07-17 PROBLEM — E29.1 HYPOGONADISM MALE: Status: ACTIVE | Noted: 2023-07-17

## 2023-07-17 PROBLEM — K21.9 GASTROESOPHAGEAL REFLUX DISEASE: Status: ACTIVE | Noted: 2023-07-17

## 2023-07-17 PROBLEM — H26.492 PCO (POSTERIOR CAPSULAR OPACIFICATION), LEFT: Status: ACTIVE | Noted: 2023-07-17

## 2023-07-17 PROBLEM — I10 HTN (HYPERTENSION), BENIGN: Status: ACTIVE | Noted: 2023-07-17

## 2023-07-17 PROBLEM — E78.5 HYPERLIPIDEMIA: Status: ACTIVE | Noted: 2023-07-17

## 2023-07-17 PROBLEM — Z96.1 PSEUDOPHAKIA: Status: ACTIVE | Noted: 2023-07-17

## 2023-07-17 PROBLEM — K59.09 CONSTIPATION, CHRONIC: Status: ACTIVE | Noted: 2023-07-17

## 2023-07-17 PROBLEM — R13.12 OROPHARYNGEAL DYSPHAGIA: Status: ACTIVE | Noted: 2023-07-17

## 2023-07-17 PROBLEM — R10.84 ABDOMINAL PAIN, DIFFUSE: Status: ACTIVE | Noted: 2023-07-17

## 2023-07-17 PROBLEM — L98.9 SKIN LESION OF SCALP: Status: ACTIVE | Noted: 2023-07-17

## 2023-07-17 PROBLEM — L21.9 DERMATITIS SEBORRHEICA: Status: ACTIVE | Noted: 2023-07-17

## 2023-07-17 RX ORDER — CETIRIZINE HYDROCHLORIDE 10 MG/1
1 TABLET ORAL DAILY
COMMUNITY
Start: 2021-03-03 | End: 2023-07-24 | Stop reason: WASHOUT

## 2023-07-17 RX ORDER — LUBIPROSTONE 24 UG/1
1 CAPSULE ORAL
COMMUNITY
Start: 2023-05-24

## 2023-07-17 RX ORDER — ACYCLOVIR 200 MG/1
CAPSULE ORAL
COMMUNITY
Start: 2022-08-19 | End: 2023-07-24 | Stop reason: WASHOUT

## 2023-07-17 RX ORDER — BACLOFEN 10 MG/1
TABLET ORAL 2 TIMES DAILY
COMMUNITY
Start: 2019-08-12

## 2023-07-17 RX ORDER — TRIAMCINOLONE ACETONIDE 0.25 MG/G
CREAM TOPICAL
COMMUNITY
Start: 2022-10-21 | End: 2023-07-24 | Stop reason: WASHOUT

## 2023-07-17 RX ORDER — FAMCICLOVIR 500 MG/1
2 TABLET ORAL 2 TIMES DAILY
COMMUNITY
Start: 2022-11-30 | End: 2023-07-24 | Stop reason: WASHOUT

## 2023-07-17 RX ORDER — TALC
POWDER (GRAM) TOPICAL
COMMUNITY
Start: 2022-09-07

## 2023-07-17 RX ORDER — NEEDLES, FILTER 19GX1 1/2"
NEEDLE, DISPOSABLE MISCELLANEOUS
COMMUNITY
Start: 2022-08-01

## 2023-07-17 RX ORDER — LIDOCAINE HYDROCHLORIDE 20 MG/ML
SOLUTION ORAL; TOPICAL
COMMUNITY
Start: 2022-12-02 | End: 2023-07-24 | Stop reason: WASHOUT

## 2023-07-17 RX ORDER — FLUTICASONE PROPIONATE 50 MCG
2 SPRAY, SUSPENSION (ML) NASAL DAILY
COMMUNITY
Start: 2020-09-29

## 2023-07-17 RX ORDER — TALC
POWDER (GRAM) TOPICAL
COMMUNITY
Start: 2022-09-07 | End: 2023-07-24 | Stop reason: WASHOUT

## 2023-07-17 RX ORDER — MULTIVIT WITH MINERALS/HERBS
1 TABLET ORAL DAILY
COMMUNITY
Start: 2022-09-07

## 2023-07-17 RX ORDER — NIACIN (INOSITOL NIACINATE) 400(500MG)
CAPSULE ORAL
COMMUNITY
Start: 2022-09-07

## 2023-07-17 RX ORDER — GABAPENTIN 300 MG/1
CAPSULE ORAL
COMMUNITY
Start: 2020-06-05 | End: 2023-07-24 | Stop reason: SDUPTHER

## 2023-07-17 RX ORDER — LISINOPRIL 20 MG/1
1 TABLET ORAL DAILY
COMMUNITY
Start: 2019-12-05

## 2023-07-17 RX ORDER — CHOLECALCIFEROL (VITAMIN D3) 25 MCG
1 TABLET ORAL DAILY
COMMUNITY
Start: 2022-09-07

## 2023-07-17 RX ORDER — GLUCOSAMINE/CHONDR SU A SOD 750-600 MG
1 TABLET ORAL DAILY
COMMUNITY
Start: 2022-09-07

## 2023-07-17 RX ORDER — TRIAMCINOLONE ACETONIDE 1 MG/G
CREAM TOPICAL
COMMUNITY
Start: 2022-10-21 | End: 2023-07-24 | Stop reason: WASHOUT

## 2023-07-17 RX ORDER — AMOXICILLIN AND CLAVULANATE POTASSIUM 875; 125 MG/1; MG/1
1 TABLET, FILM COATED ORAL
COMMUNITY
Start: 2022-11-28 | End: 2023-07-24 | Stop reason: WASHOUT

## 2023-07-17 RX ORDER — PRAVASTATIN SODIUM 80 MG/1
1 TABLET ORAL DAILY
COMMUNITY
Start: 2020-02-10

## 2023-07-17 RX ORDER — ZINC GLUCONATE 100 MG
1 TABLET ORAL DAILY
COMMUNITY
Start: 2022-09-07

## 2023-07-17 RX ORDER — FLUOROURACIL 50 MG/G
CREAM TOPICAL
COMMUNITY
Start: 2022-10-21 | End: 2023-07-24 | Stop reason: WASHOUT

## 2023-07-17 RX ORDER — SILDENAFIL 100 MG/1
TABLET, FILM COATED ORAL
COMMUNITY
Start: 2022-04-29 | End: 2023-12-10 | Stop reason: SDUPTHER

## 2023-07-17 RX ORDER — PANTOPRAZOLE SODIUM 40 MG/1
1 TABLET, DELAYED RELEASE ORAL DAILY
COMMUNITY
Start: 2022-04-29

## 2023-07-17 RX ORDER — FLASH GLUCOSE SENSOR
KIT MISCELLANEOUS
COMMUNITY
Start: 2023-03-01 | End: 2023-07-18 | Stop reason: SDUPTHER

## 2023-07-17 RX ORDER — TESTOSTERONE CYPIONATE 200 MG/ML
INJECTION, SOLUTION INTRAMUSCULAR
COMMUNITY
Start: 2022-07-29 | End: 2023-11-07 | Stop reason: SDUPTHER

## 2023-07-17 RX ORDER — METFORMIN HYDROCHLORIDE 1000 MG/1
1 TABLET ORAL
COMMUNITY
Start: 2020-01-20 | End: 2024-04-09 | Stop reason: SDUPTHER

## 2023-07-17 RX ORDER — PHENYLEPHRINE HCL 10 MG
TABLET ORAL 2 TIMES DAILY
COMMUNITY

## 2023-07-18 DIAGNOSIS — E11.69 TYPE 2 DIABETES MELLITUS WITH OTHER SPECIFIED COMPLICATION, UNSPECIFIED WHETHER LONG TERM INSULIN USE (MULTI): ICD-10-CM

## 2023-07-18 RX ORDER — FLASH GLUCOSE SENSOR
KIT MISCELLANEOUS
Qty: 2 EACH | Refills: 1 | Status: SHIPPED | OUTPATIENT
Start: 2023-07-18 | End: 2023-09-19 | Stop reason: SDUPTHER

## 2023-07-18 NOTE — TELEPHONE ENCOUNTER
Rx Refill Request Telephone Encounter    Name:  Arya Reid  :  [id number]  Medication Name:  freestyle rigo sensor kit    Specific Pharmacy location: [facility]     Date of last appointment:  3/2/23  Date of next appointment:  23  Best number to reach patient:  [phone]

## 2023-07-24 ENCOUNTER — OFFICE VISIT (OUTPATIENT)
Dept: PRIMARY CARE | Facility: CLINIC | Age: 68
End: 2023-07-24
Payer: MEDICARE

## 2023-07-24 ENCOUNTER — LAB (OUTPATIENT)
Dept: LAB | Facility: LAB | Age: 68
End: 2023-07-24
Payer: MEDICARE

## 2023-07-24 VITALS
DIASTOLIC BLOOD PRESSURE: 62 MMHG | SYSTOLIC BLOOD PRESSURE: 128 MMHG | BODY MASS INDEX: 21.42 KG/M2 | HEART RATE: 91 BPM | HEIGHT: 70 IN | WEIGHT: 149.6 LBS | RESPIRATION RATE: 12 BRPM | OXYGEN SATURATION: 95 % | TEMPERATURE: 97.2 F

## 2023-07-24 DIAGNOSIS — F17.200 SMOKER: ICD-10-CM

## 2023-07-24 DIAGNOSIS — I10 HTN (HYPERTENSION), BENIGN: ICD-10-CM

## 2023-07-24 DIAGNOSIS — E78.2 MIXED HYPERLIPIDEMIA: ICD-10-CM

## 2023-07-24 DIAGNOSIS — E29.1 HYPOGONADISM MALE: ICD-10-CM

## 2023-07-24 DIAGNOSIS — E11.9 TYPE 2 DIABETES MELLITUS WITHOUT COMPLICATION, WITHOUT LONG-TERM CURRENT USE OF INSULIN (MULTI): Primary | ICD-10-CM

## 2023-07-24 DIAGNOSIS — E11.9 TYPE 2 DIABETES MELLITUS WITHOUT COMPLICATION, WITHOUT LONG-TERM CURRENT USE OF INSULIN (MULTI): ICD-10-CM

## 2023-07-24 LAB
ALANINE AMINOTRANSFERASE (SGPT) (U/L) IN SER/PLAS: 16 U/L (ref 10–52)
ALBUMIN (G/DL) IN SER/PLAS: 4.9 G/DL (ref 3.4–5)
ALKALINE PHOSPHATASE (U/L) IN SER/PLAS: 44 U/L (ref 33–136)
ANION GAP IN SER/PLAS: 14 MMOL/L (ref 10–20)
ASPARTATE AMINOTRANSFERASE (SGOT) (U/L) IN SER/PLAS: 16 U/L (ref 9–39)
BASOPHILS (10*3/UL) IN BLOOD BY AUTOMATED COUNT: 0.07 X10E9/L (ref 0–0.1)
BASOPHILS/100 LEUKOCYTES IN BLOOD BY AUTOMATED COUNT: 1.4 % (ref 0–2)
BILIRUBIN TOTAL (MG/DL) IN SER/PLAS: 0.3 MG/DL (ref 0–1.2)
CALCIUM (MG/DL) IN SER/PLAS: 9.7 MG/DL (ref 8.6–10.3)
CARBON DIOXIDE, TOTAL (MMOL/L) IN SER/PLAS: 25 MMOL/L (ref 21–32)
CHLORIDE (MMOL/L) IN SER/PLAS: 100 MMOL/L (ref 98–107)
CHOLESTEROL (MG/DL) IN SER/PLAS: 182 MG/DL (ref 0–199)
CHOLESTEROL IN HDL (MG/DL) IN SER/PLAS: 74.7 MG/DL
CHOLESTEROL/HDL RATIO: 2.4
CREATININE (MG/DL) IN SER/PLAS: 0.73 MG/DL (ref 0.5–1.3)
EOSINOPHILS (10*3/UL) IN BLOOD BY AUTOMATED COUNT: 0.31 X10E9/L (ref 0–0.7)
EOSINOPHILS/100 LEUKOCYTES IN BLOOD BY AUTOMATED COUNT: 6 % (ref 0–6)
ERYTHROCYTE DISTRIBUTION WIDTH (RATIO) BY AUTOMATED COUNT: 13.4 % (ref 11.5–14.5)
ERYTHROCYTE MEAN CORPUSCULAR HEMOGLOBIN CONCENTRATION (G/DL) BY AUTOMATED: 34.3 G/DL (ref 32–36)
ERYTHROCYTE MEAN CORPUSCULAR VOLUME (FL) BY AUTOMATED COUNT: 94 FL (ref 80–100)
ERYTHROCYTES (10*6/UL) IN BLOOD BY AUTOMATED COUNT: 4.26 X10E12/L (ref 4.5–5.9)
ESTIMATED AVERAGE GLUCOSE FOR HBA1C: 120 MG/DL
GFR MALE: >90 ML/MIN/1.73M2
GLUCOSE (MG/DL) IN SER/PLAS: 121 MG/DL (ref 74–99)
HEMATOCRIT (%) IN BLOOD BY AUTOMATED COUNT: 40 % (ref 41–52)
HEMOGLOBIN (G/DL) IN BLOOD: 13.7 G/DL (ref 13.5–17.5)
HEMOGLOBIN A1C/HEMOGLOBIN TOTAL IN BLOOD: 5.8 %
IMMATURE GRANULOCYTES/100 LEUKOCYTES IN BLOOD BY AUTOMATED COUNT: 0.2 % (ref 0–0.9)
LDL: 91 MG/DL (ref 0–99)
LEUKOCYTES (10*3/UL) IN BLOOD BY AUTOMATED COUNT: 5.1 X10E9/L (ref 4.4–11.3)
LYMPHOCYTES (10*3/UL) IN BLOOD BY AUTOMATED COUNT: 1.33 X10E9/L (ref 1.2–4.8)
LYMPHOCYTES/100 LEUKOCYTES IN BLOOD BY AUTOMATED COUNT: 25.9 % (ref 13–44)
MONOCYTES (10*3/UL) IN BLOOD BY AUTOMATED COUNT: 0.57 X10E9/L (ref 0.1–1)
MONOCYTES/100 LEUKOCYTES IN BLOOD BY AUTOMATED COUNT: 11.1 % (ref 2–10)
NEUTROPHILS (10*3/UL) IN BLOOD BY AUTOMATED COUNT: 2.85 X10E9/L (ref 1.2–7.7)
NEUTROPHILS/100 LEUKOCYTES IN BLOOD BY AUTOMATED COUNT: 55.4 % (ref 40–80)
PLATELETS (10*3/UL) IN BLOOD AUTOMATED COUNT: 314 X10E9/L (ref 150–450)
POTASSIUM (MMOL/L) IN SER/PLAS: 4.4 MMOL/L (ref 3.5–5.3)
PROTEIN TOTAL: 7.3 G/DL (ref 6.4–8.2)
SODIUM (MMOL/L) IN SER/PLAS: 135 MMOL/L (ref 136–145)
TESTOSTERONE (NG/DL) IN SER/PLAS: 337 NG/DL (ref 240–1000)
TRIGLYCERIDE (MG/DL) IN SER/PLAS: 83 MG/DL (ref 0–149)
UREA NITROGEN (MG/DL) IN SER/PLAS: 10 MG/DL (ref 6–23)
VLDL: 17 MG/DL (ref 0–40)

## 2023-07-24 PROCEDURE — 80061 LIPID PANEL: CPT

## 2023-07-24 PROCEDURE — 99214 OFFICE O/P EST MOD 30 MIN: CPT | Performed by: FAMILY MEDICINE

## 2023-07-24 PROCEDURE — 3078F DIAST BP <80 MM HG: CPT | Performed by: FAMILY MEDICINE

## 2023-07-24 PROCEDURE — 85025 COMPLETE CBC W/AUTO DIFF WBC: CPT

## 2023-07-24 PROCEDURE — 4010F ACE/ARB THERAPY RXD/TAKEN: CPT | Performed by: FAMILY MEDICINE

## 2023-07-24 PROCEDURE — 84403 ASSAY OF TOTAL TESTOSTERONE: CPT

## 2023-07-24 PROCEDURE — 3074F SYST BP LT 130 MM HG: CPT | Performed by: FAMILY MEDICINE

## 2023-07-24 PROCEDURE — 80053 COMPREHEN METABOLIC PANEL: CPT

## 2023-07-24 PROCEDURE — 3044F HG A1C LEVEL LT 7.0%: CPT | Performed by: FAMILY MEDICINE

## 2023-07-24 PROCEDURE — 1159F MED LIST DOCD IN RCRD: CPT | Performed by: FAMILY MEDICINE

## 2023-07-24 PROCEDURE — 83036 HEMOGLOBIN GLYCOSYLATED A1C: CPT

## 2023-07-24 PROCEDURE — 36415 COLL VENOUS BLD VENIPUNCTURE: CPT

## 2023-07-24 RX ORDER — GABAPENTIN 100 MG/1
100 CAPSULE ORAL 3 TIMES DAILY
Qty: 90 CAPSULE | Refills: 1 | Status: SHIPPED | OUTPATIENT
Start: 2023-07-24 | End: 2023-09-25 | Stop reason: SDUPTHER

## 2023-07-24 RX ORDER — VARENICLINE TARTRATE 1 MG/1
1 TABLET, FILM COATED ORAL 2 TIMES DAILY
Qty: 60 TABLET | Refills: 5 | Status: SHIPPED | OUTPATIENT
Start: 2023-07-24

## 2023-07-24 ASSESSMENT — ENCOUNTER SYMPTOMS
DEPRESSION: 0
LOSS OF SENSATION IN FEET: 0
OCCASIONAL FEELINGS OF UNSTEADINESS: 0

## 2023-07-24 NOTE — PROGRESS NOTES
Subjective   Patient ID:  Arya Reid is a 67 y.o. male patient who presents today for Diabetes, Hypertension, Hyperlipidemia, Hypogonadism, and GERD    Diabetes Mellitus:  Reports taking medication as advised and denies side effects. The patient's is checking sugars and reports values to be in expected ranges. Checking blood sugar routinely and denies any hypoglycemic events since last visit.   last eye exam: 3/2023  last foot exam: DUE  Hypertension: reports no side effects to prescribed medication. The patient reports good compliance with the medication. The patient is trying to follow a low-salt diet.  Hyperlipidemia: Reports taking medication as advised and without side effects.  The patient is reporting no leg cramping in particular. The patient is trying to follow a low-fat diet.   GERD: The patient is not reporting any significant heartburn or indigestion. The patient is not getting any symptoms at night. Symptoms are stable so long as medications are taken.  Hypogonadism this patient takes testosterone total 400 mg every 90 days.  He is reporting low energy levels now.  He does report sleeping well went 7 to 8 hours at night.  Patient also has resumed smoking and would like to quit again.    Past Medical, Surgical, and Family History reviewed and updated in chart.     Reviewed all medications by prescribing practitioner or clinical pharmacist (such as prescriptions, OTCs, herbal therapies and supplements) and documented in the medical record.     No Known Allergies        The patient denies having the following symptoms: chest pain, chest pressure, fever, chills, N/V/D, constipation, dizziness, headaches, SOB.        Patient Care Team:  Shahzad Bradley MD as PCP - General  Shahzad Bradley MD as PCP - Employee [employer] PCP    Objective   Vitals:  There were no vitals taken for this visit.    Physical Exam  Vitals reviewed.   Constitutional:       Appearance: Normal appearance.   Neck:      Vascular:  No carotid bruit.   Cardiovascular:      Rate and Rhythm: Normal rate and regular rhythm.      Pulses: Normal pulses.      Heart sounds: Normal heart sounds.   Pulmonary:      Effort: Pulmonary effort is normal. No respiratory distress.      Breath sounds: Normal breath sounds. No wheezing.   Abdominal:      General: There is no distension.      Palpations: Abdomen is soft. There is no mass.      Tenderness: There is no abdominal tenderness. There is no right CVA tenderness, left CVA tenderness, guarding or rebound.   Musculoskeletal:      Cervical back: Normal range of motion and neck supple. No rigidity.      Right lower leg: No edema.      Left lower leg: No edema.   Lymphadenopathy:      Cervical: No cervical adenopathy.   Neurological:      Mental Status: He is alert.       Assessment/Plan   Problem List Items Addressed This Visit    None    Diabetes mellitus is clinically stable so we will continue with current medications and lab work to confirm status ordered.  Hypertension is well controlled, continue with current medications.  Hyperlipidemia is clinically stable so we will continue with current medications and lab work to confirm status ordered.  Hypogonadism is clinically stable so we will continue with current medications and lab work to confirm status ordered.  Smoking he is willing to quit, add Chantix.      This patient will follow-up in 4 months with lab work prior. Other follow-up will be as needed.

## 2023-09-19 DIAGNOSIS — E11.69 TYPE 2 DIABETES MELLITUS WITH OTHER SPECIFIED COMPLICATION, UNSPECIFIED WHETHER LONG TERM INSULIN USE (MULTI): ICD-10-CM

## 2023-09-19 RX ORDER — FLASH GLUCOSE SENSOR
KIT MISCELLANEOUS
Qty: 2 EACH | Refills: 1 | Status: SHIPPED | OUTPATIENT
Start: 2023-09-19 | End: 2023-11-17 | Stop reason: SDUPTHER

## 2023-09-19 NOTE — TELEPHONE ENCOUNTER
Rx Refill Request     Name: Arya Reid  :  1955   Medication Name:  freestyle 14 day sensor  Specific Pharmacy location:  [facility]   Date of last appointment:  23  Date of next appointment:  23  Best number to reach patient:  [phone]     \

## 2023-09-25 DIAGNOSIS — E11.9 TYPE 2 DIABETES MELLITUS WITHOUT COMPLICATION, WITHOUT LONG-TERM CURRENT USE OF INSULIN (MULTI): ICD-10-CM

## 2023-09-25 RX ORDER — GABAPENTIN 100 MG/1
100 CAPSULE ORAL 3 TIMES DAILY
Qty: 90 CAPSULE | Refills: 1 | Status: SHIPPED | OUTPATIENT
Start: 2023-09-25 | End: 2024-03-18 | Stop reason: SDUPTHER

## 2023-09-25 NOTE — TELEPHONE ENCOUNTER
Rx Refill Request     Name: Arya Reid  :  1955   Medication Name:  gabapentin   Specific Pharmacy location:  [facility]   Date of last appointment:  23  Date of next appointment:  2023  Best number to reach patient:  [phone]

## 2023-11-07 DIAGNOSIS — E29.1 HYPOGONADISM MALE: Primary | ICD-10-CM

## 2023-11-07 NOTE — TELEPHONE ENCOUNTER
Rx Refill Request     Name: Arya Reid  :  1955   Medication Name:  Testoterone  mg mL SDV 1 ML  Specific Pharmacy location:  [facility]   Date of last appointment:  2023  Date of next appointment: 2023   Best number to reach patient:  [phone]         RX PENDED to [facility] as directed by fax

## 2023-11-08 RX ORDER — TESTOSTERONE CYPIONATE 200 MG/ML
200 INJECTION, SOLUTION INTRAMUSCULAR
Qty: 1 ML | Refills: 0 | Status: SHIPPED | OUTPATIENT
Start: 2023-11-08 | End: 2023-12-10 | Stop reason: SDUPTHER

## 2023-11-17 DIAGNOSIS — E11.69 TYPE 2 DIABETES MELLITUS WITH OTHER SPECIFIED COMPLICATION, UNSPECIFIED WHETHER LONG TERM INSULIN USE (MULTI): Primary | ICD-10-CM

## 2023-11-17 RX ORDER — FLASH GLUCOSE SENSOR
KIT MISCELLANEOUS
Qty: 2 EACH | Refills: 1 | Status: SHIPPED | OUTPATIENT
Start: 2023-11-17 | End: 2024-01-02 | Stop reason: SDUPTHER

## 2023-11-17 NOTE — TELEPHONE ENCOUNTER
Rx Refill Request     Name: Arya Reid  :  1955   Medication Name:    FreeStyle Ruba sensor system (FreeStyle Ruab 14 Day Sensor) kit    Last fill: 10.16.2023 28 day supply Q: 2   Specific Pharmacy location:  [facility]   Date of last appointment: 2023   Date of next appointment:  2023   Best number to reach patient:  [phone]         RX PENDED to [facility] as directed by fax.

## 2023-11-21 ENCOUNTER — LAB (OUTPATIENT)
Dept: LAB | Facility: LAB | Age: 68
End: 2023-11-21
Payer: MEDICARE

## 2023-11-21 DIAGNOSIS — I10 HTN (HYPERTENSION), BENIGN: ICD-10-CM

## 2023-11-21 DIAGNOSIS — E78.2 MIXED HYPERLIPIDEMIA: ICD-10-CM

## 2023-11-21 DIAGNOSIS — E11.9 TYPE 2 DIABETES MELLITUS WITHOUT COMPLICATION, WITHOUT LONG-TERM CURRENT USE OF INSULIN (MULTI): ICD-10-CM

## 2023-11-21 DIAGNOSIS — E29.1 HYPOGONADISM MALE: ICD-10-CM

## 2023-11-21 LAB
ALBUMIN SERPL BCP-MCNC: 4.6 G/DL (ref 3.4–5)
ALP SERPL-CCNC: 55 U/L (ref 33–136)
ALT SERPL W P-5'-P-CCNC: 16 U/L (ref 10–52)
ANION GAP SERPL CALC-SCNC: 13 MMOL/L (ref 10–20)
AST SERPL W P-5'-P-CCNC: 17 U/L (ref 9–39)
BASOPHILS # BLD AUTO: 0.09 X10*3/UL (ref 0–0.1)
BASOPHILS NFR BLD AUTO: 1.9 %
BILIRUB SERPL-MCNC: 0.4 MG/DL (ref 0–1.2)
BUN SERPL-MCNC: 15 MG/DL (ref 6–23)
CALCIUM SERPL-MCNC: 9.6 MG/DL (ref 8.6–10.3)
CHLORIDE SERPL-SCNC: 97 MMOL/L (ref 98–107)
CHOLEST SERPL-MCNC: 144 MG/DL (ref 0–199)
CHOLESTEROL/HDL RATIO: 2.5
CO2 SERPL-SCNC: 29 MMOL/L (ref 21–32)
CREAT SERPL-MCNC: 0.66 MG/DL (ref 0.5–1.3)
EOSINOPHIL # BLD AUTO: 0.29 X10*3/UL (ref 0–0.7)
EOSINOPHIL NFR BLD AUTO: 6 %
ERYTHROCYTE [DISTWIDTH] IN BLOOD BY AUTOMATED COUNT: 13.5 % (ref 11.5–14.5)
EST. AVERAGE GLUCOSE BLD GHB EST-MCNC: 123 MG/DL
GFR SERPL CREATININE-BSD FRML MDRD: >90 ML/MIN/1.73M*2
GLUCOSE SERPL-MCNC: 116 MG/DL (ref 74–99)
HBA1C MFR BLD: 5.9 %
HCT VFR BLD AUTO: 39.1 % (ref 41–52)
HDLC SERPL-MCNC: 57.4 MG/DL
HGB BLD-MCNC: 13.2 G/DL (ref 13.5–17.5)
IMM GRANULOCYTES # BLD AUTO: 0.02 X10*3/UL (ref 0–0.7)
IMM GRANULOCYTES NFR BLD AUTO: 0.4 % (ref 0–0.9)
LDLC SERPL CALC-MCNC: 70 MG/DL
LYMPHOCYTES # BLD AUTO: 1.52 X10*3/UL (ref 1.2–4.8)
LYMPHOCYTES NFR BLD AUTO: 31.3 %
MCH RBC QN AUTO: 31.7 PG (ref 26–34)
MCHC RBC AUTO-ENTMCNC: 33.8 G/DL (ref 32–36)
MCV RBC AUTO: 94 FL (ref 80–100)
MONOCYTES # BLD AUTO: 0.58 X10*3/UL (ref 0.1–1)
MONOCYTES NFR BLD AUTO: 11.9 %
NEUTROPHILS # BLD AUTO: 2.36 X10*3/UL (ref 1.2–7.7)
NEUTROPHILS NFR BLD AUTO: 48.5 %
NON HDL CHOLESTEROL: 87 MG/DL (ref 0–149)
NRBC BLD-RTO: 0 /100 WBCS (ref 0–0)
PLATELET # BLD AUTO: 304 X10*3/UL (ref 150–450)
POTASSIUM SERPL-SCNC: 4 MMOL/L (ref 3.5–5.3)
PROT SERPL-MCNC: 6.8 G/DL (ref 6.4–8.2)
RBC # BLD AUTO: 4.16 X10*6/UL (ref 4.5–5.9)
SODIUM SERPL-SCNC: 135 MMOL/L (ref 136–145)
TESTOST SERPL-MCNC: 588 NG/DL (ref 240–1000)
TRIGL SERPL-MCNC: 82 MG/DL (ref 0–149)
VLDL: 16 MG/DL (ref 0–40)
WBC # BLD AUTO: 4.9 X10*3/UL (ref 4.4–11.3)

## 2023-11-21 PROCEDURE — 80061 LIPID PANEL: CPT

## 2023-11-21 PROCEDURE — 80053 COMPREHEN METABOLIC PANEL: CPT

## 2023-11-21 PROCEDURE — 84403 ASSAY OF TOTAL TESTOSTERONE: CPT

## 2023-11-21 PROCEDURE — 36415 COLL VENOUS BLD VENIPUNCTURE: CPT

## 2023-11-21 PROCEDURE — 83036 HEMOGLOBIN GLYCOSYLATED A1C: CPT

## 2023-11-21 PROCEDURE — 85025 COMPLETE CBC W/AUTO DIFF WBC: CPT

## 2023-11-26 PROBLEM — L57.0 ACTINIC KERATOSIS: Status: ACTIVE | Noted: 2022-10-21

## 2023-11-26 PROBLEM — L28.0 LICHEN SIMPLEX CHRONICUS: Status: ACTIVE | Noted: 2022-10-21

## 2023-11-26 PROBLEM — E11.9 DIABETES (MULTI): Status: RESOLVED | Noted: 2023-07-17 | Resolved: 2023-11-26

## 2023-11-26 PROBLEM — D18.01 HEMANGIOMA OF SKIN AND SUBCUTANEOUS TISSUE: Status: ACTIVE | Noted: 2022-10-21

## 2023-11-26 PROBLEM — R12 HEARTBURN: Status: RESOLVED | Noted: 2023-07-17 | Resolved: 2023-11-26

## 2023-11-26 NOTE — PROGRESS NOTES
"Subjective    Patient ID: Arya Reid is a 68 y.o. male who presents for Hypertension, Hyperlipidemia, Diabetes, and Low Energy.     Hypertension: The patient is here for follow-up of elevated blood pressure. He is adherent to a low salt diet. Blood pressure is well controlled at home. No new myalgias or GI upset on diet control.    Hyperlipidemia: The patient is present today for a follow up of hyperlipidemia. He denies having any leg cramping in particular. He is trying to follow a low-fat diet.     Diabetes Mellitus: The patient presents today for a follow up of DM. Patient denies any side effects to the medications.     Hypogonadism:  The patient is present for a follow up of hypogonadism. Testosterone levels are 588. Patient receives testosterone injections.     Low energy: Patient is reporting low energy, denies insomnia, depression. He reports that he does not have the energy to get up and do anything.     The patient denies having the following symptoms: chest pain, chest pressure, fever, chills, N/V/D, constipation, dizziness, headaches, SOB.    Objective   Vitals:  /74   Pulse 97   Temp 36.6 °C (97.8 °F)   Resp 16   Ht 1.778 m (5' 10\")   Wt 70.9 kg (156 lb 3.2 oz)   SpO2 94%   BMI 22.41 kg/m²     Physical Exam  Vitals reviewed.   Constitutional:       Appearance: Normal appearance.   Neck:      Vascular: No carotid bruit.   Cardiovascular:      Rate and Rhythm: Normal rate and regular rhythm.      Pulses: Normal pulses.      Heart sounds: Normal heart sounds.   Pulmonary:      Effort: Pulmonary effort is normal. No respiratory distress.      Breath sounds: Normal breath sounds. No wheezing.   Abdominal:      General: There is no distension.      Palpations: Abdomen is soft. There is no mass.      Tenderness: There is no abdominal tenderness. There is no right CVA tenderness, left CVA tenderness, guarding or rebound.   Musculoskeletal:      Cervical back: Normal range of motion and " neck supple. No rigidity.      Right lower leg: No edema.      Left lower leg: No edema.   Lymphadenopathy:      Cervical: No cervical adenopathy.   Neurological:      Mental Status: He is alert.            Labs reviewed from :11/21/2023 CMP, CBC, Lipid, HgA1C 5.9 % ; Glucose 116; sodium 135; Testosterone 588. Slightly anemic      Assessment/Plan   Problem List Items Addressed This Visit       HTN (hypertension), benign - Primary      Is stable, continue with current treatment.          Relevant Orders    CBC and Auto Differential    Comprehensive Metabolic Panel    Follow Up In Advanced Primary Care - PCP - Medicare Annual    Hyperlipidemia     Is clinically stable so we will continue with current medications and lab work to confirm status ordered.           Relevant Orders    Lipid Panel    Hypogonadism male     Is clinically stable so we will continue with current medications and lab work to confirm status ordered.          Relevant Orders    Testosterone    Type 2 diabetes mellitus without complication, without long-term current use of insulin (CMS/[facility])     Is clinically stable so we will continue with current medications and lab work to confirm status ordered.          Relevant Orders    Hemoglobin A1C    Low energy      Is present and symptomatic, will continue to monitor          Relevant Orders    TSH with reflex to Free T4 if abnormal    Iron and TIBC       Follow up in: 6 month(s) or sooner if needed with labs prior.     Scribe Attestation  By signing my name below, I, Ozzy López   attest that this documentation has been prepared under the direction and in the presence of Shahzad Bradley MD.

## 2023-11-27 ENCOUNTER — OFFICE VISIT (OUTPATIENT)
Dept: PRIMARY CARE | Facility: CLINIC | Age: 68
End: 2023-11-27
Payer: MEDICARE

## 2023-11-27 VITALS
WEIGHT: 156.2 LBS | BODY MASS INDEX: 22.36 KG/M2 | HEART RATE: 97 BPM | HEIGHT: 70 IN | OXYGEN SATURATION: 94 % | TEMPERATURE: 97.8 F | DIASTOLIC BLOOD PRESSURE: 74 MMHG | RESPIRATION RATE: 16 BRPM | SYSTOLIC BLOOD PRESSURE: 132 MMHG

## 2023-11-27 DIAGNOSIS — E11.9 TYPE 2 DIABETES MELLITUS WITHOUT COMPLICATION, WITHOUT LONG-TERM CURRENT USE OF INSULIN (MULTI): ICD-10-CM

## 2023-11-27 DIAGNOSIS — E78.2 MIXED HYPERLIPIDEMIA: ICD-10-CM

## 2023-11-27 DIAGNOSIS — E29.1 HYPOGONADISM MALE: ICD-10-CM

## 2023-11-27 DIAGNOSIS — R53.83 LOW ENERGY: ICD-10-CM

## 2023-11-27 DIAGNOSIS — I10 HTN (HYPERTENSION), BENIGN: Primary | ICD-10-CM

## 2023-11-27 PROCEDURE — 1160F RVW MEDS BY RX/DR IN RCRD: CPT | Performed by: FAMILY MEDICINE

## 2023-11-27 PROCEDURE — 1159F MED LIST DOCD IN RCRD: CPT | Performed by: FAMILY MEDICINE

## 2023-11-27 PROCEDURE — 3078F DIAST BP <80 MM HG: CPT | Performed by: FAMILY MEDICINE

## 2023-11-27 PROCEDURE — 4010F ACE/ARB THERAPY RXD/TAKEN: CPT | Performed by: FAMILY MEDICINE

## 2023-11-27 PROCEDURE — 99214 OFFICE O/P EST MOD 30 MIN: CPT | Performed by: FAMILY MEDICINE

## 2023-11-27 PROCEDURE — 3044F HG A1C LEVEL LT 7.0%: CPT | Performed by: FAMILY MEDICINE

## 2023-11-27 PROCEDURE — 3075F SYST BP GE 130 - 139MM HG: CPT | Performed by: FAMILY MEDICINE

## 2023-11-27 PROCEDURE — 3048F LDL-C <100 MG/DL: CPT | Performed by: FAMILY MEDICINE

## 2023-11-27 ASSESSMENT — ENCOUNTER SYMPTOMS
LOSS OF SENSATION IN FEET: 0
DEPRESSION: 0
OCCASIONAL FEELINGS OF UNSTEADINESS: 0

## 2023-12-10 DIAGNOSIS — E29.1 HYPOGONADISM MALE: ICD-10-CM

## 2023-12-10 DIAGNOSIS — N52.9 ERECTILE DYSFUNCTION, UNSPECIFIED ERECTILE DYSFUNCTION TYPE: Primary | ICD-10-CM

## 2023-12-10 NOTE — TELEPHONE ENCOUNTER
Rx Refill Request     Name: Arya Reid  :  1955   Medication Name:    sildenafil (Viagra) 100 mg tablet     Last fill: 2023 6 day supply 0 refills   2. testosterone cypionate (Depo-Testosterone) 200 mg/mL injection    Last fill: 2023 28 day supply 1 mL  CSA: 2022  ?   UDS:  N/A  Specific Pharmacy location:  [facility]   Date of last appointment:  2023  Date of next appointment:  2024  Best number to reach patient:  [phone]       RX PENDED to [facility]  as directed by fax

## 2023-12-11 RX ORDER — TESTOSTERONE CYPIONATE 200 MG/ML
200 INJECTION, SOLUTION INTRAMUSCULAR
Qty: 1 ML | Refills: 0 | Status: SHIPPED | OUTPATIENT
Start: 2023-12-11 | End: 2024-02-06 | Stop reason: SDUPTHER

## 2023-12-11 RX ORDER — SILDENAFIL 100 MG/1
100 TABLET, FILM COATED ORAL AS NEEDED
Qty: 6 TABLET | Refills: 0 | Status: SHIPPED | OUTPATIENT
Start: 2023-12-11 | End: 2024-02-06 | Stop reason: SDUPTHER

## 2023-12-19 ENCOUNTER — OFFICE VISIT (OUTPATIENT)
Dept: DERMATOLOGY | Facility: CLINIC | Age: 68
End: 2023-12-19
Payer: MEDICARE

## 2023-12-19 DIAGNOSIS — D18.01 HEMANGIOMA OF SKIN: ICD-10-CM

## 2023-12-19 DIAGNOSIS — L82.0 INFLAMED SEBORRHEIC KERATOSIS: ICD-10-CM

## 2023-12-19 DIAGNOSIS — D22.71 MELANOCYTIC NEVI OF RIGHT LOWER LIMB, INCLUDING HIP: ICD-10-CM

## 2023-12-19 DIAGNOSIS — Z12.83 ENCOUNTER FOR SCREENING FOR MALIGNANT NEOPLASM OF SKIN: Primary | ICD-10-CM

## 2023-12-19 DIAGNOSIS — L81.4 LENTIGO: ICD-10-CM

## 2023-12-19 DIAGNOSIS — L82.1 SEBORRHEIC KERATOSIS: ICD-10-CM

## 2023-12-19 DIAGNOSIS — L57.0 ACTINIC KERATOSIS: ICD-10-CM

## 2023-12-19 DIAGNOSIS — D22.60 MELANOCYTIC NEVI OF UNSPECIFIED UPPER LIMB, INCLUDING SHOULDER: ICD-10-CM

## 2023-12-19 DIAGNOSIS — D22.72 MELANOCYTIC NEVI OF LEFT LOWER EXTREMITY OR HIP: ICD-10-CM

## 2023-12-19 DIAGNOSIS — D22.5 MELANOCYTIC NEVI OF TRUNK: ICD-10-CM

## 2023-12-19 DIAGNOSIS — L57.8 PHOTOAGING OF SKIN: ICD-10-CM

## 2023-12-19 PROCEDURE — 1160F RVW MEDS BY RX/DR IN RCRD: CPT | Performed by: DERMATOLOGY

## 2023-12-19 PROCEDURE — 1159F MED LIST DOCD IN RCRD: CPT | Performed by: DERMATOLOGY

## 2023-12-19 PROCEDURE — 17000 DESTRUCT PREMALG LESION: CPT | Performed by: DERMATOLOGY

## 2023-12-19 PROCEDURE — 4010F ACE/ARB THERAPY RXD/TAKEN: CPT | Performed by: DERMATOLOGY

## 2023-12-19 PROCEDURE — 3048F LDL-C <100 MG/DL: CPT | Performed by: DERMATOLOGY

## 2023-12-19 PROCEDURE — 3044F HG A1C LEVEL LT 7.0%: CPT | Performed by: DERMATOLOGY

## 2023-12-19 PROCEDURE — 17003 DESTRUCT PREMALG LES 2-14: CPT | Performed by: DERMATOLOGY

## 2023-12-19 PROCEDURE — 99213 OFFICE O/P EST LOW 20 MIN: CPT | Performed by: DERMATOLOGY

## 2023-12-19 ASSESSMENT — DERMATOLOGY QUALITY OF LIFE (QOL) ASSESSMENT
RATE HOW BOTHERED YOU ARE BY SYMPTOMS OF YOUR SKIN PROBLEM (EG, ITCHING, STINGING BURNING, HURTING OR SKIN IRRITATION): 0 - NEVER BOTHERED
RATE HOW BOTHERED YOU ARE BY EFFECTS OF YOUR SKIN PROBLEMS ON YOUR ACTIVITIES (EG, GOING OUT, ACCOMPLISHING WHAT YOU WANT, WORK ACTIVITIES OR YOUR RELATIONSHIPS WITH OTHERS): 0 - NEVER BOTHERED
ARE THERE EXCLUSIONS OR EXCEPTIONS FOR THE QUALITY OF LIFE ASSESSMENT: NO
WHAT SINGLE SKIN CONDITION LISTED BELOW IS THE PATIENT ANSWERING THE QUALITY-OF-LIFE ASSESSMENT QUESTIONS ABOUT: NONE OF THE ABOVE
RATE HOW EMOTIONALLY BOTHERED YOU ARE BY YOUR SKIN PROBLEM (FOR EXAMPLE, WORRY, EMBARRASSMENT, FRUSTRATION): 0 - NEVER BOTHERED

## 2023-12-19 ASSESSMENT — PATIENT GLOBAL ASSESSMENT (PGA): PATIENT GLOBAL ASSESSMENT: PATIENT GLOBAL ASSESSMENT:  2 - MILD

## 2023-12-19 ASSESSMENT — DERMATOLOGY PATIENT ASSESSMENT
DO YOU HAVE ANY NEW OR CHANGING LESIONS: YES
DO YOU USE A TANNING BED: NO
ARE YOU AN ORGAN TRANSPLANT RECIPIENT: NO
DO YOU USE SUNSCREEN: OCCASIONALLY
WHERE ARE THESE NEW OR CHANGING LESIONS LOCATED: LEFT LEG

## 2023-12-19 ASSESSMENT — ITCH NUMERIC RATING SCALE: HOW SEVERE IS YOUR ITCHING?: 0

## 2023-12-19 NOTE — PROGRESS NOTES
"Subjective     Arya Reid is a 68 y.o. male who presents for the following: Skin Check (Pt here for yearly FBSE. Hx of Aks(prescribed 5FU in past). Lesion left lower leg pt is concerned about. ). He did not use the 5fu as previously prescribed.  He has a lesion on the forehead that is raised, rough and irritated.    Review of Systems:  No other skin or systemic complaints other than what is documented elsewhere in the note.    The following portions of the chart were reviewed this encounter and updated as appropriate:       Specialty Problems          Dermatology Problems    Actinic keratosis    Hemangioma of skin and subcutaneous tissue    Lichen simplex chronicus    Dermatitis seborrheica    Skin lesion of scalp     Past Medical History:  Arya Reid  has a past medical history of Actinic keratosis, Pain in unspecified finger(s) (06/01/2021), and Personal history of urinary calculi (12/09/2019).    Past Surgical History:  Arya Reid  has a past surgical history that includes Other surgical history (09/07/2022); Other surgical history (09/07/2022); Other surgical history (09/07/2022); Other surgical history (09/07/2022); Other surgical history (09/07/2022); and Other surgical history (09/07/2022).    Family History:  Patient family history includes Cancer in his father and mother; Hypertension in his father.    Social History:  Arya Reid  reports that he has been smoking cigarettes. He has never used smokeless tobacco. He reports current alcohol use. He reports that he does not use drugs.    Allergies:  Patient has no known allergies.    Current Medications / CAM's:    Current Outpatient Medications:     baclofen (Lioresal) 10 mg tablet, Take by mouth twice a day., Disp: , Rfl:     BD Integra Syringe 3 mL 22 gauge x 1 1/2\" syringe, USE 1 SYRINGE ONCE MONTHY WITH TESTOSTERONE, Disp: , Rfl:     cholecalciferol (Vitamin D-3) 25 MCG (1000 UT) tablet, Take 1 tablet (25 mcg) by " mouth once daily., Disp: , Rfl:     Cinnamon 500 mg capsule, Take by mouth twice a day., Disp: , Rfl:     coenzyme Q10-vitamin E 100-5 mg-unit capsule, Take by mouth., Disp: , Rfl:     fish oil concentrate (Omega-3) 120-180 mg capsule, Take 1 tablet by mouth once daily., Disp: , Rfl:     fluticasone (Flonase) 50 mcg/actuation nasal spray, Administer 2 sprays into affected nostril(s) once daily., Disp: , Rfl:     FreeStyle Ruba sensor system (FreeStyle Ruba 14 Day Sensor) kit, USE ONE SENSOR UNDER THE SKIN EVERY 14 DAYS, Disp: 2 each, Rfl: 1    gabapentin (Neurontin) 100 mg capsule, Take 1 capsule (100 mg) by mouth 3 times a day., Disp: 90 capsule, Rfl: 1    ibuprofen 800 mg tablet, Take 1 tablet (800 mg) by mouth every 8 hours if needed for moderate pain (4 - 6)., Disp: 180 tablet, Rfl: 5    lisinopril 20 mg tablet, Take 1 tablet (20 mg) by mouth once daily., Disp: , Rfl:     lubiprostone (Amitiza) 24 mcg capsule, Take 1 capsule (24 mcg) by mouth 2 times a day with meals., Disp: , Rfl:     magnesium oxide (Mag-Ox) 250 mg magnesium tablet, Take by mouth., Disp: , Rfl:     metFORMIN (Glucophage) 1,000 mg tablet, Take 1 tablet (1,000 mg) by mouth 2 times a day with meals., Disp: , Rfl:     milk thistle seed extract 175 mg capsule, Take 1 tablet by mouth once daily., Disp: , Rfl:     pantoprazole (ProtoNix) 40 mg EC tablet, Take 1 tablet (40 mg) by mouth once daily., Disp: , Rfl:     phytonadione, vit K1, (vitamin k) 100 mcg tablet, Take 1 tablet (100 mcg) by mouth once daily., Disp: , Rfl:     pravastatin (Pravachol) 80 mg tablet, Take 1 tablet (80 mg) by mouth once daily., Disp: , Rfl:     sildenafil (Viagra) 100 mg tablet, Take 1 tablet (100 mg) by mouth if needed for erectile dysfunction for up to 6 days., Disp: 6 tablet, Rfl: 0    testosterone cypionate (Depo-Testosterone) 200 mg/mL injection, Inject 1 mL (200 mg) into the muscle every 28 (twenty-eight) days for 28 days., Disp: 1 mL, Rfl: 0    varenicline  "(Chantix) 1 mg tablet, Take 1 tablet (1 mg) by mouth 2 times a day. Take with full glass of water., Disp: 60 tablet, Rfl: 5    vitamin B complex (Vitamins B Complex) tablet, Take 1 tablet by mouth once daily., Disp: , Rfl:      Objective   Well appearing patient in no apparent distress; mood and affect are within normal limits.    A full examination was performed including scalp, head, eyes, ears, nose, lips, neck, chest, axillae, abdomen, back, buttocks, bilateral upper extremities, bilateral lower extremities, hands, feet, fingers, toes, fingernails, and toenails. All findings within normal limits unless otherwise noted below.    No suspicious lesions noted on examination today    Cherry red papules    Scattered, uniform and benign-appearing, regular brown melanocytic papules and macules.    Scattered, uniform and benign-appearing, regular brown melanocytic papules and macules.    Scattered, uniform and benign-appearing, regular brown melanocytic papules and macules.    Tan-brown symmetric macules and papules    Brown, tan waxy macules and stuck on appearing papules and plaques    Mottled pigmentation with telangiectasias and brown reticular macules in sun exposed areas of the body.    Scattered tan macules in sun-exposed areas.    Mid Forehead, Mid Frontal Scalp, Mid Parietal Scalp, Right Buccal Cheek, Right Forehead  Erythematous macules with gritty scale.    Mid Forehead  pink and brown \"stuck on\" verrucous scaly papule with surrounding erythema and bloody crust.         Assessment/Plan   Encounter for screening for malignant neoplasm of skin    The risk of chronic, cumulative sun damage and risk of development of skin cancer was reviewed today.   The importance of sun protection was reviewed: including the use of a broad spectrum sunscreen that protects against both UVA/UVB rays, with ingredients such as Zinc oxide or titanium dioxide, wearing sun protective clothing and sun avoidance. We reviewed the warning " signs of non-melanoma skin cancer and ABCDEs of melanoma  Please follow up should you notice any new or changing pre-existing skin lesion.    Actinic keratosis (5)  Mid Frontal Scalp; Mid Parietal Scalp; Mid Forehead; Right Buccal Cheek; Right Forehead    Lesions are due to cumulative sun damage over time and are pre-cancerous. They have a risk (although small in majority of patients) of developing into squamous cell carcinoma and therefore treatment recommendations were offered and discussed.   Treatments Discussed include LN2, photodynamic (blue light) therapy & topical chemotherapy creams, risks and benefits of each.     The risks and benefits of LN2 were reviewed including incomplete removal, crusting, blister hypo and/or hyperpigmentation, scarring and recurrence. Pt elected for LN2 today    Destr of lesion - Mid Forehead, Mid Frontal Scalp, Mid Parietal Scalp, Right Buccal Cheek, Right Forehead  Complexity: simple    Destruction method: cryotherapy    Informed consent: discussed and consent obtained    Lesion destroyed using liquid nitrogen: Yes    Cryotherapy cycles:  1  Outcome: patient tolerated procedure well with no complications    Post-procedure details: wound care instructions given      Related Procedures  Follow Up In Dermatology - Established Patient    Inflamed seborrheic keratosis  Mid Forehead    The benign nature of these skin lesions reviewed, reassure provided. Due to symptomatic nature, patient offered treatment with LN2 vs. Curettage. Patient elected for LN2. See procedure note.     The risks and benefits of LN2 were reviewed including incomplete removal, crusting, blister hypo and/or hyperpigmentation, scarring and recurrence.    Treated as courtesy with LN2 today.    Destr of lesion - Mid Forehead  Complexity: simple    Destruction method: cryotherapy    Informed consent: discussed and consent obtained    Lesion destroyed using liquid nitrogen: Yes    Cryotherapy cycles:  2  Outcome:  patient tolerated procedure well with no complications    Post-procedure details: wound care instructions given      Hemangioma of skin    The benign nature of these skin lesions were reviewed, no treatment is necessary.   Please follow up for any new or pre-existing lesion that is changing in size, shape, color, becomes painful, tender, itches or bleed.    Melanocytic nevi of unspecified upper limb, including shoulder    Clinically benign appearing nevi, no treatment is necessary.  The importance of sun protection was reviewed: including the use of a broad spectrum sunscreen that protects against both UVA/UVB rays, with ingredients such as Zinc oxide or titanium dioxide, wearing sun protective clothing and sun avoidance.   ABCDEs of melanoma reviewed.  Please follow up should you notice any new or changing pre-existing skin lesion.    Melanocytic nevi of left lower extremity or hip    Clinically benign appearing nevi, no treatment is necessary.  The importance of sun protection was reviewed: including the use of a broad spectrum sunscreen that protects against both UVA/UVB rays, with ingredients such as Zinc oxide or titanium dioxide, wearing sun protective clothing and sun avoidance.   ABCDEs of melanoma reviewed.  Please follow up should you notice any new or changing pre-existing skin lesion.    Melanocytic nevi of right lower limb, including hip    Clinically benign appearing nevi, no treatment is necessary.  The importance of sun protection was reviewed: including the use of a broad spectrum sunscreen that protects against both UVA/UVB rays, with ingredients such as Zinc oxide or titanium dioxide, wearing sun protective clothing and sun avoidance.   ABCDEs of melanoma reviewed.  Please follow up should you notice any new or changing pre-existing skin lesion.    Melanocytic nevi of trunk    Clinically benign appearing nevi, no treatment is necessary.  The importance of sun protection was reviewed: including the  use of a broad spectrum sunscreen that protects against both UVA/UVB rays, with ingredients such as Zinc oxide or titanium dioxide, wearing sun protective clothing and sun avoidance.   ABCDEs of melanoma reviewed.  Please follow up should you notice any new or changing pre-existing skin lesion.    Seborrheic keratosis    The benign nature of these skin lesions reviewed, reassure provided and no further treatment needed at this time.   These lesions can be removed, if symptomatic (itching, bleeding, rubbing on clothing, painful), otherwise removal is considered cosmetic.     Photoaging of skin    The risk of chronic, cumulative sun damage and risk of development of skin cancer was reviewed today.   The importance of sun protection was reviewed: including the use of a broad spectrum sunscreen that protects against both UVA/UVB rays, with ingredients such as Zinc oxide or titanium dioxide, wearing sun protective clothing and sun avoidance. We reviewed the warning signs of non-melanoma skin cancer and ABCDEs of melanoma  Please follow up should you notice any new or changing pre-existing skin lesion.    Lentigo    These are benign skin lesions due to sun exposure. They will darken in response to sun exposure. They should be monitored for change in size, shape or color.  These lesions can be treated cosmetically with topical creams, liquid nitrogen and a variety of lasers.         12/19/2023 12:33 PM  Sara Tony MD  Dermatology Resident, PGY-4     Follow up in 1 year for FBSE or sooner for any concerning lesion.    I saw and evaluated the patient. I personally obtained the key and critical portions of the history and physical exam or was physically present for key and critical portions performed by the resident/fellow. I reviewed the resident/fellow's documentation and discussed the patient with the resident/fellow. I agree with the resident/fellow's medical decision making as documented in the note.    Lisseth WILEY  DO Frank

## 2024-01-02 DIAGNOSIS — E11.69 TYPE 2 DIABETES MELLITUS WITH OTHER SPECIFIED COMPLICATION, UNSPECIFIED WHETHER LONG TERM INSULIN USE (MULTI): Primary | ICD-10-CM

## 2024-01-02 RX ORDER — FLASH GLUCOSE SENSOR
KIT MISCELLANEOUS
Qty: 2 EACH | Refills: 0 | Status: SHIPPED | OUTPATIENT
Start: 2024-01-02 | End: 2024-02-06 | Stop reason: SDUPTHER

## 2024-01-02 NOTE — TELEPHONE ENCOUNTER
Rx Refill Request     Name: Arya Reid  :  1955   Medication Name:    FreeStyle Ruba sensor system (FreeStyle Ruba 14 Day Sensor    Last fill: 12.10.2023 28 day supply Q 2  Specific Pharmacy location:  [facility]   Date of last appointment:  2023  Date of next appointment: 2024  Best number to reach patient:  [phone]       RX PENDED to [facility]  as directed by fax      Pharmacy note updated with   Dr Robles covering for Dr. Bradley

## 2024-02-06 DIAGNOSIS — N52.9 ERECTILE DYSFUNCTION, UNSPECIFIED ERECTILE DYSFUNCTION TYPE: ICD-10-CM

## 2024-02-06 DIAGNOSIS — E29.1 HYPOGONADISM MALE: ICD-10-CM

## 2024-02-06 DIAGNOSIS — E11.69 TYPE 2 DIABETES MELLITUS WITH OTHER SPECIFIED COMPLICATION, UNSPECIFIED WHETHER LONG TERM INSULIN USE (MULTI): ICD-10-CM

## 2024-02-06 RX ORDER — FLASH GLUCOSE SENSOR
KIT MISCELLANEOUS
Qty: 2 EACH | Refills: 0 | Status: SHIPPED | OUTPATIENT
Start: 2024-02-06 | End: 2024-03-18 | Stop reason: SDUPTHER

## 2024-02-06 RX ORDER — SILDENAFIL 100 MG/1
100 TABLET, FILM COATED ORAL AS NEEDED
Qty: 6 TABLET | Refills: 0 | Status: SHIPPED | OUTPATIENT
Start: 2024-02-06 | End: 2024-02-12

## 2024-02-06 RX ORDER — TESTOSTERONE CYPIONATE 200 MG/ML
200 INJECTION, SOLUTION INTRAMUSCULAR
Qty: 1 ML | Refills: 0 | Status: SHIPPED | OUTPATIENT
Start: 2024-02-06 | End: 2024-03-05

## 2024-02-06 NOTE — TELEPHONE ENCOUNTER
Rx Refill Request     Name: Arya Reid  :  1955   Medication Name:  Testosterone, SILDENAFIL,  freestyle rigo sensor  Specific Pharmacy location:  [facility]   Date of last appointment:  2023  Date of next appointment:  2024  Best number to reach patient:  [phone]    Date of last agreement 2022

## 2024-03-18 DIAGNOSIS — E11.69 TYPE 2 DIABETES MELLITUS WITH OTHER SPECIFIED COMPLICATION, UNSPECIFIED WHETHER LONG TERM INSULIN USE (MULTI): ICD-10-CM

## 2024-03-18 DIAGNOSIS — E11.9 TYPE 2 DIABETES MELLITUS WITHOUT COMPLICATION, WITHOUT LONG-TERM CURRENT USE OF INSULIN (MULTI): ICD-10-CM

## 2024-03-18 NOTE — TELEPHONE ENCOUNTER
Rx Refill Request Telephone Encounter    Name:  Arya Reid  :  [id number]  Medication Name:    Freestyle Lite LANCETS AND TEST STRIPS  gabapentin (Neurontin) 100 mg capsule     Specific Pharmacy location:  [facility]   Date of last appointment:  23  Date of next appointment:  24  Best number to reach patient:  [phone]        Medication Name:  flash glucose sensor kit (FreeStyle Ruba 14 Day Sensor) kit     Specific Pharmacy location:  [facility]  Date of last appointment:  23  Date of next appointment:  24  Best number to reach patient:  [phone]

## 2024-03-19 RX ORDER — GABAPENTIN 100 MG/1
100 CAPSULE ORAL 3 TIMES DAILY
Qty: 90 CAPSULE | Refills: 1 | Status: SHIPPED | OUTPATIENT
Start: 2024-03-19 | End: 2024-05-21 | Stop reason: SDUPTHER

## 2024-03-19 RX ORDER — FLASH GLUCOSE SENSOR
KIT MISCELLANEOUS
Qty: 2 EACH | Refills: 0 | Status: SHIPPED | OUTPATIENT
Start: 2024-03-19 | End: 2024-04-16

## 2024-04-09 DIAGNOSIS — E11.9 TYPE 2 DIABETES MELLITUS WITHOUT COMPLICATION, WITHOUT LONG-TERM CURRENT USE OF INSULIN (MULTI): Primary | ICD-10-CM

## 2024-04-09 RX ORDER — METFORMIN HYDROCHLORIDE 1000 MG/1
1000 TABLET ORAL
Qty: 60 TABLET | Refills: 2 | Status: SHIPPED | OUTPATIENT
Start: 2024-04-09 | End: 2024-07-08

## 2024-04-09 NOTE — TELEPHONE ENCOUNTER
Rx Refill Request Telephone Encounter    Name:  Arya Reid  :  [id number]  Medication Name:  metformin (Glucophage) 1,000 mg   Specific Pharmacy location:  [facility]  Date of last appointment:    Date of next appointment:    Best number to reach patient:  [phone]

## 2024-04-15 DIAGNOSIS — E11.69 TYPE 2 DIABETES MELLITUS WITH OTHER SPECIFIED COMPLICATION, UNSPECIFIED WHETHER LONG TERM INSULIN USE (MULTI): ICD-10-CM

## 2024-04-16 RX ORDER — FLASH GLUCOSE SENSOR
KIT MISCELLANEOUS
Qty: 2 EACH | Refills: 0 | Status: SHIPPED | OUTPATIENT
Start: 2024-04-16 | End: 2024-05-14

## 2024-04-22 ENCOUNTER — LAB (OUTPATIENT)
Dept: LAB | Facility: LAB | Age: 69
End: 2024-04-22
Payer: MEDICARE

## 2024-04-22 DIAGNOSIS — E29.1 HYPOGONADISM MALE: ICD-10-CM

## 2024-04-22 DIAGNOSIS — E11.9 TYPE 2 DIABETES MELLITUS WITHOUT COMPLICATION, WITHOUT LONG-TERM CURRENT USE OF INSULIN (MULTI): ICD-10-CM

## 2024-04-22 DIAGNOSIS — I10 HTN (HYPERTENSION), BENIGN: ICD-10-CM

## 2024-04-22 DIAGNOSIS — E78.2 MIXED HYPERLIPIDEMIA: ICD-10-CM

## 2024-04-22 DIAGNOSIS — R53.83 LOW ENERGY: ICD-10-CM

## 2024-04-22 LAB
ALBUMIN SERPL BCP-MCNC: 4.6 G/DL (ref 3.4–5)
ALP SERPL-CCNC: 53 U/L (ref 33–136)
ALT SERPL W P-5'-P-CCNC: 14 U/L (ref 10–52)
ANION GAP SERPL CALC-SCNC: 12 MMOL/L (ref 10–20)
AST SERPL W P-5'-P-CCNC: 15 U/L (ref 9–39)
BASOPHILS # BLD AUTO: 0.08 X10*3/UL (ref 0–0.1)
BASOPHILS NFR BLD AUTO: 1.9 %
BILIRUB SERPL-MCNC: 0.4 MG/DL (ref 0–1.2)
BUN SERPL-MCNC: 13 MG/DL (ref 6–23)
CALCIUM SERPL-MCNC: 9.6 MG/DL (ref 8.6–10.3)
CHLORIDE SERPL-SCNC: 98 MMOL/L (ref 98–107)
CHOLEST SERPL-MCNC: 191 MG/DL (ref 0–199)
CHOLESTEROL/HDL RATIO: 2.5
CO2 SERPL-SCNC: 27 MMOL/L (ref 21–32)
CREAT SERPL-MCNC: 0.67 MG/DL (ref 0.5–1.3)
EGFRCR SERPLBLD CKD-EPI 2021: >90 ML/MIN/1.73M*2
EOSINOPHIL # BLD AUTO: 0.26 X10*3/UL (ref 0–0.7)
EOSINOPHIL NFR BLD AUTO: 6.1 %
ERYTHROCYTE [DISTWIDTH] IN BLOOD BY AUTOMATED COUNT: 14 % (ref 11.5–14.5)
EST. AVERAGE GLUCOSE BLD GHB EST-MCNC: 140 MG/DL
GLUCOSE SERPL-MCNC: 152 MG/DL (ref 74–99)
HBA1C MFR BLD: 6.5 %
HCT VFR BLD AUTO: 38.4 % (ref 41–52)
HDLC SERPL-MCNC: 75.4 MG/DL
HGB BLD-MCNC: 12.9 G/DL (ref 13.5–17.5)
IMM GRANULOCYTES # BLD AUTO: 0.01 X10*3/UL (ref 0–0.7)
IMM GRANULOCYTES NFR BLD AUTO: 0.2 % (ref 0–0.9)
LDLC SERPL CALC-MCNC: 96 MG/DL
LYMPHOCYTES # BLD AUTO: 1.27 X10*3/UL (ref 1.2–4.8)
LYMPHOCYTES NFR BLD AUTO: 29.7 %
MCH RBC QN AUTO: 31.5 PG (ref 26–34)
MCHC RBC AUTO-ENTMCNC: 33.6 G/DL (ref 32–36)
MCV RBC AUTO: 94 FL (ref 80–100)
MONOCYTES # BLD AUTO: 0.5 X10*3/UL (ref 0.1–1)
MONOCYTES NFR BLD AUTO: 11.7 %
NEUTROPHILS # BLD AUTO: 2.15 X10*3/UL (ref 1.2–7.7)
NEUTROPHILS NFR BLD AUTO: 50.4 %
NON HDL CHOLESTEROL: 116 MG/DL (ref 0–149)
NRBC BLD-RTO: 0 /100 WBCS (ref 0–0)
PLATELET # BLD AUTO: 304 X10*3/UL (ref 150–450)
POTASSIUM SERPL-SCNC: 4.2 MMOL/L (ref 3.5–5.3)
PROT SERPL-MCNC: 6.6 G/DL (ref 6.4–8.2)
RBC # BLD AUTO: 4.09 X10*6/UL (ref 4.5–5.9)
SODIUM SERPL-SCNC: 133 MMOL/L (ref 136–145)
TESTOST SERPL-MCNC: 272 NG/DL (ref 240–1000)
TRIGL SERPL-MCNC: 99 MG/DL (ref 0–149)
TSH SERPL-ACNC: 2.22 MIU/L (ref 0.44–3.98)
VLDL: 20 MG/DL (ref 0–40)
WBC # BLD AUTO: 4.3 X10*3/UL (ref 4.4–11.3)

## 2024-04-22 PROCEDURE — 36415 COLL VENOUS BLD VENIPUNCTURE: CPT

## 2024-04-22 PROCEDURE — 83036 HEMOGLOBIN GLYCOSYLATED A1C: CPT

## 2024-04-22 PROCEDURE — 84443 ASSAY THYROID STIM HORMONE: CPT

## 2024-04-22 PROCEDURE — 80061 LIPID PANEL: CPT

## 2024-04-22 PROCEDURE — 80053 COMPREHEN METABOLIC PANEL: CPT

## 2024-04-22 PROCEDURE — 84403 ASSAY OF TOTAL TESTOSTERONE: CPT

## 2024-04-22 PROCEDURE — 85025 COMPLETE CBC W/AUTO DIFF WBC: CPT

## 2024-05-14 DIAGNOSIS — E11.69 TYPE 2 DIABETES MELLITUS WITH OTHER SPECIFIED COMPLICATION, UNSPECIFIED WHETHER LONG TERM INSULIN USE (MULTI): ICD-10-CM

## 2024-05-14 RX ORDER — FLASH GLUCOSE SENSOR
KIT MISCELLANEOUS
Qty: 2 EACH | Refills: 0 | Status: SHIPPED | OUTPATIENT
Start: 2024-05-14 | End: 2024-06-07

## 2024-05-21 DIAGNOSIS — E11.9 TYPE 2 DIABETES MELLITUS WITHOUT COMPLICATION, WITHOUT LONG-TERM CURRENT USE OF INSULIN (MULTI): ICD-10-CM

## 2024-05-21 RX ORDER — GABAPENTIN 100 MG/1
100 CAPSULE ORAL 3 TIMES DAILY
Qty: 90 CAPSULE | Refills: 1 | Status: SHIPPED | OUTPATIENT
Start: 2024-05-21

## 2024-05-21 NOTE — TELEPHONE ENCOUNTER
Rx Refill Request     Name: Arya Archibaldough  :  1955   Medication Name:  gabapentin   Specific Pharmacy location:  [facility]   Date of last appointment:  23   Date of next appointment: 2024  Best number to reach patient:  [phone]

## 2024-05-22 PROBLEM — Z00.00 MEDICARE ANNUAL WELLNESS VISIT, SUBSEQUENT: Status: ACTIVE | Noted: 2024-05-22

## 2024-06-07 DIAGNOSIS — E11.69 TYPE 2 DIABETES MELLITUS WITH OTHER SPECIFIED COMPLICATION, UNSPECIFIED WHETHER LONG TERM INSULIN USE (MULTI): ICD-10-CM

## 2024-06-07 RX ORDER — FLASH GLUCOSE SENSOR
KIT MISCELLANEOUS
Qty: 2 EACH | Refills: 0 | Status: SHIPPED | OUTPATIENT
Start: 2024-06-07

## 2024-06-07 NOTE — TELEPHONE ENCOUNTER
Rx Refill Request Telephone Encounter    Name:  Arya Reid  :  [id number]  Medication Name:  freestyle rigo senor kit   Specific Pharmacy location:  [facility]   Date of last appointment:  24  Date of next appointment:  24

## 2024-07-09 ENCOUNTER — APPOINTMENT (OUTPATIENT)
Dept: PRIMARY CARE | Facility: CLINIC | Age: 69
End: 2024-07-09
Payer: MEDICARE

## 2024-07-09 VITALS
HEART RATE: 77 BPM | WEIGHT: 150.8 LBS | DIASTOLIC BLOOD PRESSURE: 66 MMHG | SYSTOLIC BLOOD PRESSURE: 120 MMHG | BODY MASS INDEX: 21.59 KG/M2 | OXYGEN SATURATION: 95 % | TEMPERATURE: 97.6 F | RESPIRATION RATE: 14 BRPM | HEIGHT: 70 IN

## 2024-07-09 DIAGNOSIS — M54.50 MIDLINE LOW BACK PAIN WITHOUT SCIATICA, UNSPECIFIED CHRONICITY: ICD-10-CM

## 2024-07-09 DIAGNOSIS — E11.9 TYPE 2 DIABETES MELLITUS WITHOUT COMPLICATION, WITHOUT LONG-TERM CURRENT USE OF INSULIN (MULTI): ICD-10-CM

## 2024-07-09 DIAGNOSIS — M54.12 CHRONIC CERVICAL RADICULOPATHY: ICD-10-CM

## 2024-07-09 DIAGNOSIS — E29.1 HYPOGONADISM MALE: ICD-10-CM

## 2024-07-09 DIAGNOSIS — Z00.00 ROUTINE GENERAL MEDICAL EXAMINATION AT HEALTH CARE FACILITY: ICD-10-CM

## 2024-07-09 DIAGNOSIS — E78.2 MIXED HYPERLIPIDEMIA: ICD-10-CM

## 2024-07-09 DIAGNOSIS — I10 HTN (HYPERTENSION), BENIGN: ICD-10-CM

## 2024-07-09 DIAGNOSIS — Z12.5 SCREENING FOR PROSTATE CANCER: ICD-10-CM

## 2024-07-09 DIAGNOSIS — Z00.00 MEDICARE ANNUAL WELLNESS VISIT, SUBSEQUENT: Primary | ICD-10-CM

## 2024-07-09 PROCEDURE — 3048F LDL-C <100 MG/DL: CPT | Performed by: FAMILY MEDICINE

## 2024-07-09 PROCEDURE — 1170F FXNL STATUS ASSESSED: CPT | Performed by: FAMILY MEDICINE

## 2024-07-09 PROCEDURE — 1160F RVW MEDS BY RX/DR IN RCRD: CPT | Performed by: FAMILY MEDICINE

## 2024-07-09 PROCEDURE — 1158F ADVNC CARE PLAN TLK DOCD: CPT | Performed by: FAMILY MEDICINE

## 2024-07-09 PROCEDURE — 3074F SYST BP LT 130 MM HG: CPT | Performed by: FAMILY MEDICINE

## 2024-07-09 PROCEDURE — 4010F ACE/ARB THERAPY RXD/TAKEN: CPT | Performed by: FAMILY MEDICINE

## 2024-07-09 PROCEDURE — 1123F ACP DISCUSS/DSCN MKR DOCD: CPT | Performed by: FAMILY MEDICINE

## 2024-07-09 PROCEDURE — G0439 PPPS, SUBSEQ VISIT: HCPCS | Performed by: FAMILY MEDICINE

## 2024-07-09 PROCEDURE — 3078F DIAST BP <80 MM HG: CPT | Performed by: FAMILY MEDICINE

## 2024-07-09 PROCEDURE — 1159F MED LIST DOCD IN RCRD: CPT | Performed by: FAMILY MEDICINE

## 2024-07-09 PROCEDURE — 3044F HG A1C LEVEL LT 7.0%: CPT | Performed by: FAMILY MEDICINE

## 2024-07-09 PROCEDURE — 99214 OFFICE O/P EST MOD 30 MIN: CPT | Performed by: FAMILY MEDICINE

## 2024-07-09 RX ORDER — IBUPROFEN 800 MG/1
800 TABLET ORAL EVERY 8 HOURS PRN
Qty: 180 TABLET | Refills: 5 | Status: SHIPPED | OUTPATIENT
Start: 2024-07-09

## 2024-07-09 RX ORDER — BACLOFEN 10 MG/1
10 TABLET ORAL 2 TIMES DAILY
Qty: 180 TABLET | Refills: 1 | Status: SHIPPED | OUTPATIENT
Start: 2024-07-09

## 2024-07-09 RX ORDER — LISINOPRIL 20 MG/1
20 TABLET ORAL DAILY
Qty: 90 TABLET | Refills: 1 | Status: SHIPPED | OUTPATIENT
Start: 2024-07-09

## 2024-07-09 RX ORDER — TESTOSTERONE CYPIONATE 200 MG/ML
200 INJECTION, SOLUTION INTRAMUSCULAR
Qty: 1 ML | Refills: 5 | Status: SHIPPED | OUTPATIENT
Start: 2024-07-09

## 2024-07-09 ASSESSMENT — ACTIVITIES OF DAILY LIVING (ADL)
GROCERY_SHOPPING: INDEPENDENT
MANAGING_FINANCES: INDEPENDENT
DOING_HOUSEWORK: INDEPENDENT
TAKING_MEDICATION: INDEPENDENT
DRESSING: INDEPENDENT
BATHING: INDEPENDENT

## 2024-07-09 ASSESSMENT — ENCOUNTER SYMPTOMS
LOSS OF SENSATION IN FEET: 0
DEPRESSION: 0
OCCASIONAL FEELINGS OF UNSTEADINESS: 0

## 2024-07-09 ASSESSMENT — PATIENT HEALTH QUESTIONNAIRE - PHQ9
2. FEELING DOWN, DEPRESSED OR HOPELESS: NOT AT ALL
SUM OF ALL RESPONSES TO PHQ9 QUESTIONS 1 AND 2: 0
1. LITTLE INTEREST OR PLEASURE IN DOING THINGS: NOT AT ALL

## 2024-07-09 NOTE — PROGRESS NOTES
Subjective   Patient ID: Arya Reid is an 68 y.o. male who is presenting today for a Medicare Annual Wellness Visit Subsequent, Diabetes, Hypertension, Hyperlipidemia, Hypogonadism, and Erectile Dysfunction .    Diabetes Mellitus: The patient presents today for a follow up of DM. Patient denies any side effects to the medications.  He is taking metformin 1000 mg twice daily. He says that his blood sugars have been running high and taking a while to lower.     Hypertension: The patient is here for follow-up of elevated blood pressure. He is adherent to a low salt diet. Blood pressure is well controlled at home. No new myalgias or GI upset on diet control. He is taking lisinopril 20 mg daily.    Hyperlipidemia: The patient is present today for a follow up of hyperlipidemia. He denies having any leg cramping in particular. He is trying to follow a low-fat diet. He is taking pravastatin 80 mg daily.     Hypogonadism: he is taking Depo-testosterone 200 mg every 28 days.     Erectile Dysfunction: he is taking sildenafil 100 mg as needed.     Back Spasm: His back has been cramping over the last few months. It is worse in the morning. It feels stiff. The cramping lessens over the course of the day but it's most severe in the morning. He takes ibuprofen as needed for the pain which helps. 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     Medicare Wellness Visit: The patients living will is active. His POA is wife, back-up POA is son, Franck.  The patients current code status is FULL CODE. The patient does not want to linger on machines. .    Encounter for subsequent AWV: Medicare wellness visit done, patient is in satisfactory living circumstances where the patient's needs are met.  This patient is advised to develop or update their living will and provide us a copy for the chart.      The patient denies having the following symptoms: chest pain, chest pressure, fever, chills, N/V/D, constipation, dizziness, headaches,  "SOB.    Objective   Vitals:  /66   Pulse 77   Temp 36.4 °C (97.6 °F)   Resp 14   Ht 1.778 m (5' 10\")   Wt 68.4 kg (150 lb 12.8 oz)   SpO2 95%   BMI 21.64 kg/m²       Physical Exam  Vitals reviewed.   Constitutional:       Appearance: Normal appearance.   Neck:      Vascular: No carotid bruit.   Cardiovascular:      Rate and Rhythm: Normal rate and regular rhythm.      Pulses: Normal pulses.      Heart sounds: Normal heart sounds.   Pulmonary:      Effort: Pulmonary effort is normal. No respiratory distress.      Breath sounds: Normal breath sounds. No wheezing.   Abdominal:      General: There is no distension.      Palpations: Abdomen is soft. There is no mass.      Tenderness: There is no abdominal tenderness. There is no right CVA tenderness, left CVA tenderness, guarding or rebound.   Musculoskeletal:      Cervical back: Normal range of motion and neck supple. No rigidity.      Right lower leg: No edema.      Left lower leg: No edema.   Lymphadenopathy:      Cervical: No cervical adenopathy.   Neurological:      Mental Status: He is alert.         Labs reviewed from :  4-            CMP, CBC, Lipid, HgA1C 6.5 % , testosterone 272    Assessment/Plan   Problem List Items Addressed This Visit       Chronic cervical radiculopathy    Relevant Medications    ibuprofen 800 mg tablet    baclofen (Lioresal) 10 mg tablet    HTN (hypertension), benign    Current Assessment & Plan      Is well controlled, continue with current medications.           Relevant Medications    lisinopril 20 mg tablet    Other Relevant Orders    CBC and Auto Differential    Comprehensive Metabolic Panel    Hyperlipidemia    Current Assessment & Plan     Well controlled, continue with current medications.          Relevant Orders    Lipid Panel    Hypogonadism male    Current Assessment & Plan      Is stable, continue with current treatment.           Relevant Medications    testosterone cypionate (Depo-Testosterone) 200 mg/mL " injection    Other Relevant Orders    Testosterone,Free and Total    Prostate Specific Antigen    Type 2 diabetes mellitus without complication, without long-term current use of insulin (Multi)    Current Assessment & Plan      Is well controlled, continue with current medications.           Relevant Orders    Follow Up In Advanced Primary Care - PCP - Established    Hemoglobin A1C    Medicare annual wellness visit, subsequent - Primary    Current Assessment & Plan     Medicare wellness visit done, patient is in satisfactory living circumstances where the patient's needs are met.  This patient is advised to develop or update their living will and provide us a copy for the chart.           Midline low back pain without sciatica    Current Assessment & Plan     Will prescribe a muscle relaxant, baclofen as needed.           Other Visit Diagnoses       Screening for prostate cancer        Relevant Orders    Prostate Specific Antigen    Routine general medical examination at health care facility                Follow up in: 3 month(s) or sooner if needed with labs prior.    Scribe Attestation  By signing my name below, IJacki , Ozzy   attest that this documentation has been prepared under the direction and in the presence of Shahzad Bradley MD.

## 2024-07-12 ENCOUNTER — TELEPHONE (OUTPATIENT)
Dept: PRIMARY CARE | Facility: CLINIC | Age: 69
End: 2024-07-12
Payer: MEDICARE

## 2024-07-12 DIAGNOSIS — E11.9 TYPE 2 DIABETES MELLITUS WITHOUT COMPLICATION, WITHOUT LONG-TERM CURRENT USE OF INSULIN (MULTI): Primary | ICD-10-CM

## 2024-07-12 NOTE — TELEPHONE ENCOUNTER
Patient called in requesting a prescription for a continuous glucose monitor be sent in to [facility]. He stated he had requested this at his appointment on 7/9 but it was never sent  Please Advise

## 2024-07-14 RX ORDER — BLOOD-GLUCOSE SENSOR
EACH MISCELLANEOUS
Qty: 6 EACH | Refills: 3 | Status: SHIPPED | OUTPATIENT
Start: 2024-07-14

## 2024-07-14 RX ORDER — BLOOD-GLUCOSE,RECEIVER,CONT
EACH MISCELLANEOUS
Qty: 1 EACH | Refills: 0 | Status: SHIPPED | OUTPATIENT
Start: 2024-07-14

## 2024-07-15 ENCOUNTER — TELEPHONE (OUTPATIENT)
Dept: PRIMARY CARE | Facility: CLINIC | Age: 69
End: 2024-07-15
Payer: MEDICARE

## 2024-07-15 NOTE — TELEPHONE ENCOUNTER
We received a request for prior authorization on the patient's testosterone cypionate (Depo-Testosterone) 200 mg/mL injection  from their pharmacy. Prior authorization was submitted to insurance today. We will await their determination.

## 2024-07-15 NOTE — TELEPHONE ENCOUNTER
Prior Authorization  has been APPROVED.    Approval valid through 07.15.2024  Approval letter scanned into media on 07.15.2024

## 2024-07-16 DIAGNOSIS — E11.69 TYPE 2 DIABETES MELLITUS WITH OTHER SPECIFIED COMPLICATION, UNSPECIFIED WHETHER LONG TERM INSULIN USE (MULTI): Primary | ICD-10-CM

## 2024-07-16 NOTE — TELEPHONE ENCOUNTER
Rx Refill Request Telephone Encounter    Name:  Arya Reid  :  [id number]  Medication Name:  free style rigo blood glucose sensor    Specific Pharmacy location:    [facility] - [place], [place] - [address]     Date of last appointment:  24  Date of next appointment:  10/4/24  Best number to reach patient:  [phone]

## 2024-07-18 RX ORDER — FLASH GLUCOSE SENSOR
KIT MISCELLANEOUS
Qty: 2 EACH | Refills: 2 | Status: SHIPPED | OUTPATIENT
Start: 2024-07-18

## 2024-07-31 DIAGNOSIS — E11.9 TYPE 2 DIABETES MELLITUS WITHOUT COMPLICATION, WITHOUT LONG-TERM CURRENT USE OF INSULIN (MULTI): ICD-10-CM

## 2024-07-31 NOTE — TELEPHONE ENCOUNTER
Rx Refill Request     Name: Arya Reid  :  1955   Medication Name:  gabapentin   Specific Pharmacy location:  [facility]  Date of last appointment:  24  Date of next appointment:  10/4/24  Best number to reach patient:  [phone]

## 2024-08-01 RX ORDER — GABAPENTIN 100 MG/1
100 CAPSULE ORAL 3 TIMES DAILY
Qty: 90 CAPSULE | Refills: 1 | Status: SHIPPED | OUTPATIENT
Start: 2024-08-01

## 2024-08-27 DIAGNOSIS — E11.9 TYPE 2 DIABETES MELLITUS WITHOUT COMPLICATION, WITHOUT LONG-TERM CURRENT USE OF INSULIN (MULTI): ICD-10-CM

## 2024-08-27 RX ORDER — METFORMIN HYDROCHLORIDE 1000 MG/1
1000 TABLET ORAL
Qty: 60 TABLET | Refills: 5 | Status: SHIPPED | OUTPATIENT
Start: 2024-08-27 | End: 2025-02-23

## 2024-08-27 NOTE — TELEPHONE ENCOUNTER
Rx Refill Request Telephone Encounter    Name:  Arya Reid  :  [id number]  Medication Name:  metformin (Glucophage) 1,000 mg   Specific Pharmacy location:  [facility]  Date of last appointment:    Date of next appointment:  10/4  Best number to reach patient:  [phone]

## 2024-09-16 DIAGNOSIS — E11.69 TYPE 2 DIABETES MELLITUS WITH OTHER SPECIFIED COMPLICATION, UNSPECIFIED WHETHER LONG TERM INSULIN USE (MULTI): ICD-10-CM

## 2024-09-16 RX ORDER — FLASH GLUCOSE SENSOR
KIT MISCELLANEOUS
Qty: 2 EACH | Refills: 2 | Status: SHIPPED | OUTPATIENT
Start: 2024-09-16

## 2024-09-16 NOTE — TELEPHONE ENCOUNTER
Rx Refill Request     Name: Arya Reid  :  1955   Medication Name:  rigo 14 day sensor kit   Specific Pharmacy location:  [facility]   Date of last appointment:  Visit date not found   Date of next appointment:  Visit date not found   Best number to reach patient:  [phone]       Recent Visits  Date Type Provider Dept   24 Office Visit Shahzad Bradley MD Do [id number] [facility]   23 Office Visit Shahzad Bradley MD Do [id number] [facility]   23 Office Visit Shahzad Bradley MD Do [id number] [facility]   Showing recent visits within past 540 days and meeting all other requirements  Future Appointments  Date Type Provider Dept   10/04/24 Appointment Shahzad Bradley MD Do [id number] [facility]   Showing future appointments within next 180 days and meeting all other requirements

## 2024-10-04 ENCOUNTER — LAB (OUTPATIENT)
Dept: LAB | Facility: LAB | Age: 69
End: 2024-10-04
Payer: MEDICARE

## 2024-10-04 ENCOUNTER — APPOINTMENT (OUTPATIENT)
Dept: PRIMARY CARE | Facility: CLINIC | Age: 69
End: 2024-10-04
Payer: MEDICARE

## 2024-10-04 VITALS
WEIGHT: 152 LBS | DIASTOLIC BLOOD PRESSURE: 82 MMHG | HEIGHT: 70 IN | RESPIRATION RATE: 18 BRPM | TEMPERATURE: 97.8 F | OXYGEN SATURATION: 98 % | HEART RATE: 76 BPM | SYSTOLIC BLOOD PRESSURE: 130 MMHG | BODY MASS INDEX: 21.76 KG/M2

## 2024-10-04 DIAGNOSIS — E78.2 MIXED HYPERLIPIDEMIA: ICD-10-CM

## 2024-10-04 DIAGNOSIS — G89.29 CHRONIC BILATERAL LOW BACK PAIN WITHOUT SCIATICA: ICD-10-CM

## 2024-10-04 DIAGNOSIS — E29.1 HYPOGONADISM MALE: ICD-10-CM

## 2024-10-04 DIAGNOSIS — E11.9 TYPE 2 DIABETES MELLITUS WITHOUT COMPLICATION, WITHOUT LONG-TERM CURRENT USE OF INSULIN (MULTI): Primary | ICD-10-CM

## 2024-10-04 DIAGNOSIS — M54.50 CHRONIC BILATERAL LOW BACK PAIN WITHOUT SCIATICA: ICD-10-CM

## 2024-10-04 DIAGNOSIS — I10 HTN (HYPERTENSION), BENIGN: ICD-10-CM

## 2024-10-04 DIAGNOSIS — N52.9 ERECTILE DYSFUNCTION, UNSPECIFIED ERECTILE DYSFUNCTION TYPE: ICD-10-CM

## 2024-10-04 DIAGNOSIS — Z12.5 SCREENING FOR PROSTATE CANCER: ICD-10-CM

## 2024-10-04 DIAGNOSIS — E11.9 TYPE 2 DIABETES MELLITUS WITHOUT COMPLICATION, WITHOUT LONG-TERM CURRENT USE OF INSULIN (MULTI): ICD-10-CM

## 2024-10-04 LAB
ALBUMIN SERPL BCP-MCNC: 5 G/DL (ref 3.4–5)
ALP SERPL-CCNC: 54 U/L (ref 33–136)
ALT SERPL W P-5'-P-CCNC: 16 U/L (ref 10–52)
ANION GAP SERPL CALC-SCNC: 13 MMOL/L (ref 10–20)
AST SERPL W P-5'-P-CCNC: 25 U/L (ref 9–39)
BASOPHILS # BLD AUTO: 0.09 X10*3/UL (ref 0–0.1)
BASOPHILS NFR BLD AUTO: 2 %
BILIRUB SERPL-MCNC: 0.4 MG/DL (ref 0–1.2)
BUN SERPL-MCNC: 18 MG/DL (ref 6–23)
CALCIUM SERPL-MCNC: 10.2 MG/DL (ref 8.6–10.3)
CHLORIDE SERPL-SCNC: 97 MMOL/L (ref 98–107)
CHOLEST SERPL-MCNC: 192 MG/DL (ref 0–199)
CHOLESTEROL/HDL RATIO: 2.2
CO2 SERPL-SCNC: 30 MMOL/L (ref 21–32)
CREAT SERPL-MCNC: 0.63 MG/DL (ref 0.5–1.3)
EGFRCR SERPLBLD CKD-EPI 2021: >90 ML/MIN/1.73M*2
EOSINOPHIL # BLD AUTO: 0.22 X10*3/UL (ref 0–0.7)
EOSINOPHIL NFR BLD AUTO: 4.8 %
ERYTHROCYTE [DISTWIDTH] IN BLOOD BY AUTOMATED COUNT: 13.7 % (ref 11.5–14.5)
EST. AVERAGE GLUCOSE BLD GHB EST-MCNC: 137 MG/DL
GLUCOSE SERPL-MCNC: 143 MG/DL (ref 74–99)
HBA1C MFR BLD: 6.4 %
HCT VFR BLD AUTO: 39.1 % (ref 41–52)
HDLC SERPL-MCNC: 85.5 MG/DL
HGB BLD-MCNC: 13.3 G/DL (ref 13.5–17.5)
IMM GRANULOCYTES # BLD AUTO: 0.01 X10*3/UL (ref 0–0.7)
IMM GRANULOCYTES NFR BLD AUTO: 0.2 % (ref 0–0.9)
LDLC SERPL CALC-MCNC: 95 MG/DL
LYMPHOCYTES # BLD AUTO: 1.29 X10*3/UL (ref 1.2–4.8)
LYMPHOCYTES NFR BLD AUTO: 28.4 %
MCH RBC QN AUTO: 32 PG (ref 26–34)
MCHC RBC AUTO-ENTMCNC: 34 G/DL (ref 32–36)
MCV RBC AUTO: 94 FL (ref 80–100)
MONOCYTES # BLD AUTO: 0.52 X10*3/UL (ref 0.1–1)
MONOCYTES NFR BLD AUTO: 11.4 %
NEUTROPHILS # BLD AUTO: 2.42 X10*3/UL (ref 1.2–7.7)
NEUTROPHILS NFR BLD AUTO: 53.2 %
NON HDL CHOLESTEROL: 107 MG/DL (ref 0–149)
NRBC BLD-RTO: 0 /100 WBCS (ref 0–0)
PLATELET # BLD AUTO: 249 X10*3/UL (ref 150–450)
POTASSIUM SERPL-SCNC: 4.7 MMOL/L (ref 3.5–5.3)
PROT SERPL-MCNC: 7.2 G/DL (ref 6.4–8.2)
PSA SERPL-MCNC: 0.56 NG/ML
RBC # BLD AUTO: 4.15 X10*6/UL (ref 4.5–5.9)
SODIUM SERPL-SCNC: 135 MMOL/L (ref 136–145)
TRIGL SERPL-MCNC: 60 MG/DL (ref 0–149)
VLDL: 12 MG/DL (ref 0–40)
WBC # BLD AUTO: 4.6 X10*3/UL (ref 4.4–11.3)

## 2024-10-04 PROCEDURE — 3075F SYST BP GE 130 - 139MM HG: CPT | Performed by: FAMILY MEDICINE

## 2024-10-04 PROCEDURE — 80061 LIPID PANEL: CPT

## 2024-10-04 PROCEDURE — 3048F LDL-C <100 MG/DL: CPT | Performed by: FAMILY MEDICINE

## 2024-10-04 PROCEDURE — 1123F ACP DISCUSS/DSCN MKR DOCD: CPT | Performed by: FAMILY MEDICINE

## 2024-10-04 PROCEDURE — 4010F ACE/ARB THERAPY RXD/TAKEN: CPT | Performed by: FAMILY MEDICINE

## 2024-10-04 PROCEDURE — 85025 COMPLETE CBC W/AUTO DIFF WBC: CPT

## 2024-10-04 PROCEDURE — 83036 HEMOGLOBIN GLYCOSYLATED A1C: CPT

## 2024-10-04 PROCEDURE — 84402 ASSAY OF FREE TESTOSTERONE: CPT

## 2024-10-04 PROCEDURE — 3079F DIAST BP 80-89 MM HG: CPT | Performed by: FAMILY MEDICINE

## 2024-10-04 PROCEDURE — 36415 COLL VENOUS BLD VENIPUNCTURE: CPT

## 2024-10-04 PROCEDURE — 99214 OFFICE O/P EST MOD 30 MIN: CPT | Performed by: FAMILY MEDICINE

## 2024-10-04 PROCEDURE — 3044F HG A1C LEVEL LT 7.0%: CPT | Performed by: FAMILY MEDICINE

## 2024-10-04 PROCEDURE — 80053 COMPREHEN METABOLIC PANEL: CPT

## 2024-10-04 PROCEDURE — 1160F RVW MEDS BY RX/DR IN RCRD: CPT | Performed by: FAMILY MEDICINE

## 2024-10-04 PROCEDURE — 1159F MED LIST DOCD IN RCRD: CPT | Performed by: FAMILY MEDICINE

## 2024-10-04 PROCEDURE — G0103 PSA SCREENING: HCPCS

## 2024-10-04 PROCEDURE — 3008F BODY MASS INDEX DOCD: CPT | Performed by: FAMILY MEDICINE

## 2024-10-04 RX ORDER — LANCETS
EACH MISCELLANEOUS
Qty: 100 EACH | Refills: 1 | Status: SHIPPED | OUTPATIENT
Start: 2024-10-04

## 2024-10-04 RX ORDER — BLOOD-GLUCOSE SENSOR
EACH MISCELLANEOUS
Qty: 6 EACH | Refills: 3 | Status: SHIPPED | OUTPATIENT
Start: 2024-10-04

## 2024-10-04 RX ORDER — SILDENAFIL 100 MG/1
100 TABLET, FILM COATED ORAL AS NEEDED
Qty: 6 TABLET | Refills: 0 | Status: SHIPPED | OUTPATIENT
Start: 2024-10-04 | End: 2024-10-10

## 2024-10-04 RX ORDER — GABAPENTIN 100 MG/1
100 CAPSULE ORAL 3 TIMES DAILY
Qty: 90 CAPSULE | Refills: 1 | Status: SHIPPED | OUTPATIENT
Start: 2024-10-04

## 2024-10-04 RX ORDER — BLOOD SUGAR DIAGNOSTIC
1 STRIP MISCELLANEOUS DAILY PRN
Qty: 50 STRIP | Refills: 1 | Status: SHIPPED | OUTPATIENT
Start: 2024-10-04

## 2024-10-04 ASSESSMENT — PATIENT HEALTH QUESTIONNAIRE - PHQ9
1. LITTLE INTEREST OR PLEASURE IN DOING THINGS: NOT AT ALL
2. FEELING DOWN, DEPRESSED OR HOPELESS: NOT AT ALL
SUM OF ALL RESPONSES TO PHQ9 QUESTIONS 1 & 2: 0

## 2024-10-04 ASSESSMENT — ANXIETY QUESTIONNAIRES
6. BECOMING EASILY ANNOYED OR IRRITABLE: NOT AT ALL
IF YOU CHECKED OFF ANY PROBLEMS ON THIS QUESTIONNAIRE, HOW DIFFICULT HAVE THESE PROBLEMS MADE IT FOR YOU TO DO YOUR WORK, TAKE CARE OF THINGS AT HOME, OR GET ALONG WITH OTHER PEOPLE: NOT DIFFICULT AT ALL
4. TROUBLE RELAXING: NOT AT ALL
2. NOT BEING ABLE TO STOP OR CONTROL WORRYING: NOT AT ALL
1. FEELING NERVOUS, ANXIOUS, OR ON EDGE: NOT AT ALL
GAD7 TOTAL SCORE: 0
3. WORRYING TOO MUCH ABOUT DIFFERENT THINGS: NOT AT ALL
5. BEING SO RESTLESS THAT IT IS HARD TO SIT STILL: NOT AT ALL
7. FEELING AFRAID AS IF SOMETHING AWFUL MIGHT HAPPEN: NOT AT ALL

## 2024-10-04 ASSESSMENT — ENCOUNTER SYMPTOMS
OCCASIONAL FEELINGS OF UNSTEADINESS: 0
DEPRESSION: 0
LOSS OF SENSATION IN FEET: 0

## 2024-10-04 NOTE — PROGRESS NOTES
Subjective   Patient ID:  Arya Reid is a 69 y.o. male patient who presents today for Diabetes, Hyperlipidemia, Hypertension, and Hypogonadism  Hypertension: The patient is here for an evaluation of elevated blood pressure. The patient is trying to follow a low-salt diet. He is adherent to a low salt diet. Blood pressure is well controlled at home. The patient has not been hospitalized for this in the last 6 months. The patient is compliant with medications. Patient denies any side effects to the medications.     Erectile Dysfunction: The patient is presenting today for a follow up of erectile dysfunction.  The patient has not been hospitalized for this in the last 6 months. The patient is compliant with medications. Patient denies any side effects to the medications.     Hyperlipidemia: The patient is presenting today for a follow up of hyperlipidemia. The patient has not been hospitalized for this in the last 6 months. The patient is compliant with medications. Patient denies any side effects to the medications.     Diabetes Mellitus: The patient presents today for a follow up of DM.The patient has not been hospitalized for this in the last 6 months.  Patient denies any side effects to the medications.   The patient is compliant with medications.  The patient is on insulin.        Back Pain: Patient presents for evaluation of low back problems. Patient states that he has been having back issues that have going on for about 6 weeks. Patient has been doing stretches and strengthening exercises. States that it is improving but would like you to be aware. Patient states he has also had pain in the hips and the glutes.    Patient advised of active labs in chart and will be getting those completed after his visit today.         The patient denies having the following symptoms: chest pain, chest pressure, fever, chills, N/V/D, constipation, dizziness, headaches, SOB.          Objective   Vitals:  /82    "Pulse 76   Temp 36.6 °C (97.8 °F)   Resp 18   Ht 1.778 m (5' 10\")   Wt 68.9 kg (152 lb)   SpO2 98%   BMI 21.81 kg/m²     Physical Exam  Vitals reviewed.   Constitutional:       Appearance: Normal appearance.   Neck:      Vascular: No carotid bruit.   Cardiovascular:      Rate and Rhythm: Normal rate and regular rhythm.      Pulses: Normal pulses.      Heart sounds: Normal heart sounds.   Pulmonary:      Effort: Pulmonary effort is normal. No respiratory distress.      Breath sounds: Normal breath sounds. No wheezing.   Abdominal:      General: There is no distension.      Palpations: Abdomen is soft. There is no mass.      Tenderness: There is no abdominal tenderness. There is no right CVA tenderness, left CVA tenderness, guarding or rebound.   Musculoskeletal:      Cervical back: Normal range of motion and neck supple. No rigidity.      Right lower leg: No edema.      Left lower leg: No edema.   Lymphadenopathy:      Cervical: No cervical adenopathy.   Neurological:      Mental Status: He is alert.           Labs reviewed from :   04/2024:           CMP, CBC, Lipid, HgA1C 6.5 %.        Assessment/Plan   Problem List Items Addressed This Visit          Cardiac and Vasculature    HTN (hypertension), benign    Relevant Orders    CBC and Auto Differential    Comprehensive Metabolic Panel    Hyperlipidemia    Relevant Orders    Lipid Panel       Endocrine/Metabolic    Hypogonadism male    Type 2 diabetes mellitus without complication, without long-term current use of insulin (Multi) - Primary    Relevant Medications    gabapentin (Neurontin) 100 mg capsule    blood-glucose sensor (Dexcom G7 Sensor) device    FreeStyle Test strip    lancets misc    Other Relevant Orders    Follow Up In [facility] - PCP - Established    Hemoglobin A1C    Albumin-Creatinine Ratio, Urine Random     Other Visit Diagnoses       Erectile dysfunction, unspecified erectile dysfunction type        Relevant Medications    " sildenafil (Viagra) 100 mg tablet    Chronic bilateral low back pain without sciatica        Relevant Orders    XR lumbar spine 2-3 views    Referral to Physical Therapy          The above diagnoses are stable or well-controlled and we will continue with the current treatments unless noted above.    Follow up in: 4 months or sooner if needed with labs today.    Scribe Attestation  By signing my name below, IRobina Scribe   attest that this documentation has been prepared under the direction and in the presence of Shahzad Bradley MD.

## 2024-10-08 LAB
TESTOSTERONE FREE (CHAN): 39.2 PG/ML (ref 35–155)
TESTOSTERONE,TOTAL,LC-MS/MS: 440 NG/DL (ref 250–1100)

## 2024-10-24 ENCOUNTER — EVALUATION (OUTPATIENT)
Dept: PHYSICAL THERAPY | Facility: CLINIC | Age: 69
End: 2024-10-24
Payer: MEDICARE

## 2024-10-24 DIAGNOSIS — M54.50 CHRONIC BILATERAL LOW BACK PAIN WITHOUT SCIATICA: ICD-10-CM

## 2024-10-24 DIAGNOSIS — G89.29 CHRONIC BILATERAL LOW BACK PAIN WITHOUT SCIATICA: ICD-10-CM

## 2024-10-24 PROCEDURE — 97161 PT EVAL LOW COMPLEX 20 MIN: CPT | Mod: GP | Performed by: PHYSICAL THERAPIST

## 2024-10-24 PROCEDURE — 97535 SELF CARE MNGMENT TRAINING: CPT | Mod: GP | Performed by: PHYSICAL THERAPIST

## 2024-10-24 ASSESSMENT — PAIN SCALES - GENERAL: PAINLEVEL_OUTOF10: 2

## 2024-10-24 ASSESSMENT — PAIN DESCRIPTION - DESCRIPTORS: DESCRIPTORS: ACHING

## 2024-10-24 ASSESSMENT — PAIN - FUNCTIONAL ASSESSMENT: PAIN_FUNCTIONAL_ASSESSMENT: 0-10

## 2024-10-24 NOTE — LETTER
October 24, 2024    Michael Arechiga, PT  [address] [place], [facility] [address]  [facility] [place] [place] [address]    Patient: Arya Reid   YOB: 1955   Date of Visit: 10/24/2024       Dear Shahzad Bradley MD  [address] [address]    The attached plan of care is being sent to you because your patient’s medical reimbursement requires that you certify the plan of care. Your signature is required to allow uninterrupted insurance coverage.      You may indicate your approval by signing below and faxing this form back to us at Dept Fax: [phone].    Please call Dept: [phone] with any questions or concerns.    Thank you for this referral,        Michael Arechiga, PT  [place] [address] [facility]  [address] [facility] [address]    Payer: Payor: MEDICARE / Plan: MEDICARE PART A AND B / Product Type: *No Product type* /                                                                         Date:     Dear Michael Arechiga, PT,     Re: Mr. Arya Reid, MRN:[id number]    I certify that I have reviewed the attached plan of care and it is medically necessary for Mr. Arya Reid (1955) who is under my care.          ______________________________________                    _________________  Provider name and credentials                                           Date and time                                                                                           Plan of Care 10/24/24   Effective from: 10/24/2024  Effective to: 1/22/2025    Plan ID: [id number]            Participants as of Finalize on 10/24/2024    Name Type Comments Contact Info    Shahzad Bradley MD [facility] Attributed Provider  [phone]    Michael Arechiga PT Physical Therapist  [phone]       Last Plan Note     Author: Michael Arechiga PT Status: Incomplete Last edited: 10/24/2024 10:45 AM       PT Initial  Evaluation    Patient Name:  Arya Reid    MRN:  [id number]    :  1955    Today's Date:  10/24/24    Time Calculation  Start Time: 104  Stop Time: 1115  Time Calculation (min): 30 min  PT Evaluation Time Entry  PT Evaluation (Low) Time Entry: 15  PT Therapeutic Procedures Time Entry  Self-Care/Home Mgmt Training: 15       Informed Consent  Patient has been informed of all evaluation findings and treatment plans and agrees to participate in Physical Therapy services and plans as outlined.    Diagnosis:  Diagnosis and Precautions: M54.50,G89.29 (ICD-10-CM) - Chronic bilateral low back pain without sciatica    Goals:   By the end of 8 visits patient will be able to do the following with < 0-1/10 bilateral glute pain:    HEP:  Patient will consistently perform HIS home exercise program for 20-30 minute sessions, 1-2x/day, 3-4 days/week independently by the end of 8 visits.    Basic ADL's:   Patient will perform bADL's/instrumental ADL's for 30 minutes moving between various closed kinetic chain postures.    ROM and Strength:  Patient will demonstrate 5/5 R HIP and L HIP strength in all planes and WNL's LUMBAR SPINE, R HIP, and L HIP AROM in all planes to improve their ability to lift, stand, ambulate and perform basic ADL's.    Stair Negotiation:  Patient will be able to ambulate up/down stairs for 1-2 flights at a time.    Gait/Locomotion:  Patient will be able to ambulate for 30-60 minutes at a time.  Minimal to no gait deviation across level ground/stairs.    Sleep:  Patient will sleep thru the night 4/7 nights/week.    Participation restrictions:  Decrease Oswestry to < or = to 15% for increased functional ability.    Pain:  Decrease pain at worst to < or = to 0-1/10 for improved QOL and ability to sleep.    No point tenderness noted over the glutes.     Plan of Care:      Treatment/Interventions: Cryotherapy, Dry needling, Education/ Instruction, Electrical stimulation, Gait training, Manual  therapy, Neuromuscular re-education, Self care/ home management, Taping techniques, Therapeutic activities, Therapeutic exercises, Ultrasound  PT Plan: Skilled PT  PT Frequency: Other (Comment) (1-2x/week)  Duration: 8 visits     Certification Period Start Date: 10/24/24  Certification Period End Date: 01/22/25  Number of Treatments Authorized: 8  Rehab Potential: Good  Plan of Care Agreement: Patient    PT Assessment:    Patient is a 69 y.o. MALE with c/o bilateral glute pain.   Patient is alert and oriented x 3.  Patient presents with medical diagnosis of M54.50,G89.29 (ICD-10-CM) - Chronic bilateral low back pain without sciatica  contributing to compensatory soft tissue dysfunction, pain, stiffness and weakness of the glutes.   Significant past medical history/past surgical history includes see below.    Skilled care is needed to progress the patient back to these activities without exacerbating symptoms.   Patient requires skilled PT services to address the problems identified and the individualized patient's goals as outlined in the problems and goals section of this evaluation.  A skilled PT is required to address these key impairments and to provide and progress with an appropriate home exercise program. Patient does not have any significant PMH influencing Rx and reports motivation to return to FUNCTIONAL ACTIVITY.   Patient demonstrates to be a good candidate for physical therapy with good rehab potential and verbalized a good understanding of HIS diagnosis, prognosis and treatment.  Goals have been established and reviewed with the patient.      PT Assessment Results: Decreased strength, Decreased range of motion, Decreased endurance, Decreased mobility, Pain  Rehab Prognosis: Good  Evaluation/Treatment Tolerance: Patient tolerated treatment well    Complexity:  Low complexity evaluation  due to a 15  minute duration, a past medical history WITHOUT any personal factors and/or comorbidities that could  impact the POC, examination of body systems completed on one to two elements, the patient presents with a stable condition, and clinical decision making using the BONNIE was of low complexity.     Prognosis:  Rehab Prognosis: Good    Problem List  Activity Limitations, Decreased Functional Level, Decreased knowledge of HEP, Flexibility, Gait issues, Pain, Range of Motion/joint mobility issues, and Strength    Impairments   IMPAIRED ROM LOWER BODY, IMPAIRED STRENGTH LOWER BODY, IMPAIRED GAIT, IMPAIRED CORE STABILITY, INCREASED PAIN, IMPAIRED FUNCTIONAL ACTIVITY LEVEL, and IMPAIRED FUNCTIONAL MOVEMENT PATTERNS    Functional Limitations:  LIMITATIONS PERFORMING BASIC ADL'S, PARTICIPATION IN HOBBIES, PARTICIPATION IN LEISURE ACTIVITIES, and PARTICIPATION IN HOME MANAGEMENT    General Visit Information:  Reason for Referral: PT Evaluation  Referred By: Dr. Shahzad Bradley  General Comment: M54.50,G89.29 (ICD-10-CM) - Chronic bilateral low back pain without sciatica    Pre-Cautions:  ROLDAN Fall Risk Score (The score of 4 or more indicates an increased risk of falling): 0     Medical Precautions:  (diabetes, history of neck surgery, L shoulder surgery, R shoulder surgery))     Reason for Visit:  PT Evaluate and Treat    Initial Evaluation:  Referred By: Dr. Shahzad Bradley    Insurance  Insurance reviewed  Name of Insurance:  Medicare  Visit No.  1  (EVAL) CHRONIC BILATERAL LBP M54.50; MEDICARE PART A+B: 20% PART B COINSUR // 240 DED // 0 COPAY // 0 OOP // UNLIM V BMN // KX MOD AFTER 20TH VISIT// [employer] SUPP IS SEC TO MEDICARE AND WILL COV PART B COINSUR AFTER DED IS MET [id number] // Larisa confirmed 10/17/24 11:27am     Subjective:    Current Episode  Date of Onset:  June 2024  Mechanism of injury:  none, denies any MVA, falls, trauma or surgery  Chief Complaint:  bilateral glute pain  Progression of symptoms:  worse over time    Pain Score:  Pain Assessment: 0-10  Pain Assessment  Pain Assessment: 0-10  0-10 (Numeric) Pain  "Score: 2 (6/10 worst, 1/10 least)  Pain Type: Chronic pain  Pain Location: Buttocks  Pain Orientation: Right, Left  Pain Descriptors: Aching  Pain Frequency: Intermittent  Pain Type: Chronic pain  Pain Location: Buttocks  Pain Orientation: Right, Left  Pain Descriptors: Aching  Pain Frequency: Intermittent    Better with:  exercise    Worse with:  sitting, walking, stairs, bending, lifting    Medical History/Surgical History:  Medical Precautions:  (diabetes, history of neck surgery, L shoulder surgery, R shoulder surgery)    Reviewed medical history form with patient (medications/allergies reviewed with patient).  Current Outpatient Medications   Medication Instructions   • baclofen (LIORESAL) 10 mg, oral, 2 times daily   • BD Integra Syringe 3 mL 22 gauge x 1 1/2\" syringe USE 1 SYRINGE ONCE MONTHY WITH TESTOSTERONE   • blood-glucose meter,continuous (Dexcom G7 ) misc Use as instructed   • blood-glucose sensor (Dexcom G7 Sensor) device Use to check blood sugar daily   • cholecalciferol (Vitamin D-3) 25 MCG (1000 UT) tablet 1 tablet, oral, Daily   • Cinnamon 500 mg capsule oral, 2 times daily   • coenzyme Q10-vitamin E 100-5 mg-unit capsule oral   • fish oil concentrate (Omega-3) 120-180 mg capsule 1 tablet, oral, Daily   • flash glucose sensor kit (FreeStyle Ruba 14 Day Sensor) kit Change sensor every 14 days.   • FreeStyle Test strip 1 strip, miscellaneous, Daily PRN   • gabapentin (NEURONTIN) 100 mg, oral, 3 times daily   • ibuprofen 800 mg, oral, Every 8 hours PRN   • lancets misc Test daily as needed   • lisinopril 20 mg, oral, Daily   • magnesium oxide (Mag-Ox) 250 mg magnesium tablet oral   • metFORMIN (GLUCOPHAGE) 1,000 mg, oral, 2 times daily (morning and late afternoon)   • phytonadione, vit K1, (vitamin k) 100 mcg tablet 1 tablet, oral, Daily   • sildenafil (VIAGRA) 100 mg, oral, As needed   • testosterone cypionate (DEPO-TESTOSTERONE) 200 mg, intramuscular, Every 28 days   • vitamin B complex " (Vitamins B Complex) tablet 1 tablet, oral, Daily     For lower back/neck only:  Previous PT:  YES  Previous chiropractic:  YES  Previous acupuncture:  NO  Previous pain management:  NO  Lower back surgery:  NO    Functional Assessment:  Level of Aurora:  Level of Aurora: Independent with ADLs and functional transfers    Social History:    Work Status:  RETIRED  Patient Awareness:  Patient is aware of HIS diagnosis and prognosis.  Social Support/History:  LIVES WITH FAMILY    Objective:    Weightbearing Status:  FWB    Skin:  skin intact over lower back    Palpation:   point tenderness over bilateral upper glutes, piriformis    Sensation:  Patient denies numbness/tingling of bilateral LOWER extremities.    Gait:  Normal gait    ROM:  AROM  Lumbar flexion WNL's  Extension limited  Rotation WNL's R and L  SB limited and painful  R knee AROM WNL's, L knee AROM WNL's, R hip AROM WNL's, L hip AROM WNL's, R ankle/foot AROM WNL's, and L ankle/foot AROM WNL's    Strength:    Bilateral hips 4/5 all planes  R knee 5/5 all planes, L knee 5/5 all planes, R ankle/foot 5/5 all planes, and L ankle/foot 5/5 all planes    Outcome measure     Oswestry Disablity Index (BONNIE): 30%     Treatment  Time in clinic started at  10:45am  Time in clinic ended at  11:15am  Total time in clinic is . 30 minutes  Total timed code time is  30 minutes    Treatment Performed Today:.   PT Initial Evaluation and Self-Care/Home Management HEP  Individual(s) Educated: Patient  Education Provided: Home Exercise Program  Diagnosis and Precautions: M54.50,G89.29 (ICD-10-CM) - Chronic bilateral low back pain without sciatica  Risk and Benefits Discussed with Patient/Caregiver/Other: yes  Patient/Caregiver Demonstrated Understanding: yes  Plan of Care Discussed and Agreed Upon: yes  Patient Response to Education: Patient/Caregiver Verbalized Understanding of Information, Patient/Caregiver Performed Return Demonstration of  Exercises/Activities    Patient instructed in a home exercise program, has been given handouts for each of the exercises performed and was given another sheet instructing patient in the amount of reps to perform and the pamela to follow while doing the exercises     Access Code: [email or web address]  URL: [email or web address]  Date: 10/24/2024  Prepared by: Michael Arechiga    Exercises  - Piriformis Mobilization with Small Ball  - 1 x daily - 3-4 x weekly - 1 sets - 2-3 reps - 90 hold  - Supine Piriformis Stretch with Foot on Ground  - 1 x daily - 4-5 x weekly - 1 sets - 5 reps - 30 hold  - Supine Single Knee to Chest Stretch  - 1 x daily - 4-5 x weekly - 1 sets - 5 reps - 30 hold  - Supine Double Knee to Chest  - 1-2 x daily - 3-4 x weekly - 1 sets - 5 reps - 30 hold  - Supine ITB Stretch with Strap  - 1 x daily - 4-5 x weekly - 1 sets - 5 reps - 30 hold  - Herbert Stretch on Table  - 1 x daily - 4-5 x weekly - 1 sets - 5 reps - 30 hold  - Supine Lower Trunk Rotation  - 1 x daily - 3-4 x weekly - 1 sets - 10 reps - 10 hold  - Supine Posterior Pelvic Tilt  - 1 x daily - 3-4 x weekly - 2 sets - 10 reps - 5-10 hold         Current Participants as of 10/24/2024    Name Type Comments Contact Info    Shahzad Bradley MD PCP - [facility] Attributed Provider  [phone]    Signature pending    Michael Arechiga PT Physical Therapist  [phone]    Signature pending

## 2024-10-24 NOTE — PROGRESS NOTES
PT Initial Evaluation    Patient Name:  Arya Reid    MRN:  [id number]    :  1955    Today's Date:  10/24/24    Time Calculation  Start Time: 104  Stop Time: 5  Time Calculation (min): 30 min  PT Evaluation Time Entry  PT Evaluation (Low) Time Entry: 15  PT Therapeutic Procedures Time Entry  Self-Care/Home Mgmt Training: 15       Informed Consent  Patient has been informed of all evaluation findings and treatment plans and agrees to participate in Physical Therapy services and plans as outlined.    Diagnosis:  Diagnosis and Precautions: M54.50,G89.29 (ICD-10-CM) - Chronic bilateral low back pain without sciatica    Goals:   By the end of 8 visits patient will be able to do the following with < 0-1/10 bilateral glute pain:    HEP:  Patient will consistently perform HIS home exercise program for 20-30 minute sessions, 1-2x/day, 3-4 days/week independently by the end of 8 visits.    Basic ADL's:   Patient will perform bADL's/instrumental ADL's for 30 minutes moving between various closed kinetic chain postures.    ROM and Strength:  Patient will demonstrate 5/5 R HIP and L HIP strength in all planes and WNL's LUMBAR SPINE, R HIP, and L HIP AROM in all planes to improve their ability to lift, stand, ambulate and perform basic ADL's.    Stair Negotiation:  Patient will be able to ambulate up/down stairs for 1-2 flights at a time.    Gait/Locomotion:  Patient will be able to ambulate for 30-60 minutes at a time.  Minimal to no gait deviation across level ground/stairs.    Sleep:  Patient will sleep thru the night 4/7 nights/week.    Participation restrictions:  Decrease Oswestry to < or = to 15% for increased functional ability.    Pain:  Decrease pain at worst to < or = to 0-1/10 for improved QOL and ability to sleep.    No point tenderness noted over the glutes.     Plan of Care:      Treatment/Interventions: Cryotherapy, Dry needling, Education/ Instruction, Electrical stimulation, Gait training,  Manual therapy, Neuromuscular re-education, Self care/ home management, Taping techniques, Therapeutic activities, Therapeutic exercises, Ultrasound  PT Plan: Skilled PT  PT Frequency: Other (Comment) (1-2x/week)  Duration: 8 visits     Certification Period Start Date: 10/24/24  Certification Period End Date: 01/22/25  Number of Treatments Authorized: 8  Rehab Potential: Good  Plan of Care Agreement: Patient    PT Assessment:    Patient is a 69 y.o. MALE with c/o bilateral glute pain.   Patient is alert and oriented x 3.  Patient presents with medical diagnosis of M54.50,G89.29 (ICD-10-CM) - Chronic bilateral low back pain without sciatica  contributing to compensatory soft tissue dysfunction, pain, stiffness and weakness of the glutes.   Significant past medical history/past surgical history includes see below.    Skilled care is needed to progress the patient back to these activities without exacerbating symptoms.   Patient requires skilled PT services to address the problems identified and the individualized patient's goals as outlined in the problems and goals section of this evaluation.  A skilled PT is required to address these key impairments and to provide and progress with an appropriate home exercise program. Patient does not have any significant PMH influencing Rx and reports motivation to return to FUNCTIONAL ACTIVITY.   Patient demonstrates to be a good candidate for physical therapy with good rehab potential and verbalized a good understanding of HIS diagnosis, prognosis and treatment.  Goals have been established and reviewed with the patient.      PT Assessment Results: Decreased strength, Decreased range of motion, Decreased endurance, Decreased mobility, Pain  Rehab Prognosis: Good  Evaluation/Treatment Tolerance: Patient tolerated treatment well    Complexity:  Low complexity evaluation  due to a 15  minute duration, a past medical history WITHOUT any personal factors and/or comorbidities that  could impact the POC, examination of body systems completed on one to two elements, the patient presents with a stable condition, and clinical decision making using the BONNIE was of low complexity.     Prognosis:  Rehab Prognosis: Good    Problem List  Activity Limitations, Decreased Functional Level, Decreased knowledge of HEP, Flexibility, Gait issues, Pain, Range of Motion/joint mobility issues, and Strength    Impairments   IMPAIRED ROM LOWER BODY, IMPAIRED STRENGTH LOWER BODY, IMPAIRED GAIT, IMPAIRED CORE STABILITY, INCREASED PAIN, IMPAIRED FUNCTIONAL ACTIVITY LEVEL, and IMPAIRED FUNCTIONAL MOVEMENT PATTERNS    Functional Limitations:  LIMITATIONS PERFORMING BASIC ADL'S, PARTICIPATION IN HOBBIES, PARTICIPATION IN LEISURE ACTIVITIES, and PARTICIPATION IN HOME MANAGEMENT    General Visit Information:  Reason for Referral: PT Evaluation  Referred By: Dr. Shahzad Bradley  General Comment: M54.50,G89.29 (ICD-10-CM) - Chronic bilateral low back pain without sciatica    Pre-Cautions:  ROLDAN Fall Risk Score (The score of 4 or more indicates an increased risk of falling): 0     Medical Precautions:  (diabetes, history of neck surgery, L shoulder surgery, R shoulder surgery))     Reason for Visit:  PT Evaluate and Treat    Initial Evaluation:  Referred By: Dr. Shahzad Bradley    Insurance  Insurance reviewed  Name of Insurance:  Medicare  Visit No.  1  (EVAL) CHRONIC BILATERAL LBP M54.50; MEDICARE PART A+B: 20% PART B COINSUR // 240 DED // 0 COPAY // 0 OOP // UNLIM V BMN // KX MOD AFTER 20TH VISIT// [employer] SUPP IS SEC TO MEDICARE AND WILL COV PART B COINSUR AFTER DED IS MET [id number] // Larisa confirmed 10/17/24 11:27am     Subjective:    Current Episode  Date of Onset:  June 2024  Mechanism of injury:  none, denies any MVA, falls, trauma or surgery  Chief Complaint:  bilateral glute pain  Progression of symptoms:  worse over time    Pain Score:  Pain Assessment: 0-10  Pain Assessment  Pain Assessment: 0-10  0-10 (Numeric) Pain  "Score: 2 (6/10 worst, 1/10 least)  Pain Type: Chronic pain  Pain Location: Buttocks  Pain Orientation: Right, Left  Pain Descriptors: Aching  Pain Frequency: Intermittent  Pain Type: Chronic pain  Pain Location: Buttocks  Pain Orientation: Right, Left  Pain Descriptors: Aching  Pain Frequency: Intermittent    Better with:  exercise    Worse with:  sitting, walking, stairs, bending, lifting    Medical History/Surgical History:  Medical Precautions:  (diabetes, history of neck surgery, L shoulder surgery, R shoulder surgery)    Reviewed medical history form with patient (medications/allergies reviewed with patient).  Current Outpatient Medications   Medication Instructions    baclofen (LIORESAL) 10 mg, oral, 2 times daily    BD Integra Syringe 3 mL 22 gauge x 1 1/2\" syringe USE 1 SYRINGE ONCE MONTHY WITH TESTOSTERONE    blood-glucose meter,continuous (Dexcom G7 ) misc Use as instructed    blood-glucose sensor (Dexcom G7 Sensor) device Use to check blood sugar daily    cholecalciferol (Vitamin D-3) 25 MCG (1000 UT) tablet 1 tablet, oral, Daily    Cinnamon 500 mg capsule oral, 2 times daily    coenzyme Q10-vitamin E 100-5 mg-unit capsule oral    fish oil concentrate (Omega-3) 120-180 mg capsule 1 tablet, oral, Daily    flash glucose sensor kit (FreeStyle Ruba 14 Day Sensor) kit Change sensor every 14 days.    FreeStyle Test strip 1 strip, miscellaneous, Daily PRN    gabapentin (NEURONTIN) 100 mg, oral, 3 times daily    ibuprofen 800 mg, oral, Every 8 hours PRN    lancets misc Test daily as needed    lisinopril 20 mg, oral, Daily    magnesium oxide (Mag-Ox) 250 mg magnesium tablet oral    metFORMIN (GLUCOPHAGE) 1,000 mg, oral, 2 times daily (morning and late afternoon)    phytonadione, vit K1, (vitamin k) 100 mcg tablet 1 tablet, oral, Daily    sildenafil (VIAGRA) 100 mg, oral, As needed    testosterone cypionate (DEPO-TESTOSTERONE) 200 mg, intramuscular, Every 28 days    vitamin B complex (Vitamins B Complex) " tablet 1 tablet, oral, Daily     For lower back/neck only:  Previous PT:  YES  Previous chiropractic:  YES  Previous acupuncture:  NO  Previous pain management:  NO  Lower back surgery:  NO    Functional Assessment:  Level of [place]:  Level of [place]: Independent with ADLs and functional transfers    Social History:    Work Status:  RETIRED  Patient Awareness:  Patient is aware of HIS diagnosis and prognosis.  Social Support/History:  LIVES WITH FAMILY    Objective:    Weightbearing Status:  FWB    Skin:  skin intact over lower back    Palpation:   point tenderness over bilateral upper glutes, piriformis    Sensation:  Patient denies numbness/tingling of bilateral LOWER extremities.    Gait:  Normal gait    ROM:  AROM  Lumbar flexion WNL's  Extension limited  Rotation WNL's R and L  SB limited and painful  R knee AROM WNL's, L knee AROM WNL's, R hip AROM WNL's, L hip AROM WNL's, R ankle/foot AROM WNL's, and L ankle/foot AROM WNL's    Strength:    Bilateral hips 4/5 all planes  R knee 5/5 all planes, L knee 5/5 all planes, R ankle/foot 5/5 all planes, and L ankle/foot 5/5 all planes    Outcome measure     Oswestry Disablity Index (BONNIE): 30%     Treatment  Time in clinic started at  10:45am  Time in clinic ended at  11:15am  Total time in clinic is . 30 minutes  Total timed code time is  30 minutes    Treatment Performed Today:.   PT Initial Evaluation and Self-Care/Home Management HEP  Individual(s) Educated: Patient  Education Provided: Home Exercise Program  Diagnosis and Precautions: M54.50,G89.29 (ICD-10-CM) - Chronic bilateral low back pain without sciatica  Risk and Benefits Discussed with Patient/Caregiver/Other: yes  Patient/Caregiver Demonstrated Understanding: yes  Plan of Care Discussed and Agreed Upon: yes  Patient Response to Education: Patient/Caregiver Verbalized Understanding of Information, Patient/Caregiver Performed Return Demonstration of Exercises/Activities    Patient instructed in a  home exercise program, has been given handouts for each of the exercises performed and was given another sheet instructing patient in the amount of reps to perform and the pamela to follow while doing the exercises     Access Code: [email or web address]  URL: [email or web address]  Date: 10/24/2024  Prepared by: Michael Arechiga    Exercises  - Piriformis Mobilization with Small Ball  - 1 x daily - 3-4 x weekly - 1 sets - 2-3 reps - 90 hold  - Supine Piriformis Stretch with Foot on Ground  - 1 x daily - 4-5 x weekly - 1 sets - 5 reps - 30 hold  - Supine Single Knee to Chest Stretch  - 1 x daily - 4-5 x weekly - 1 sets - 5 reps - 30 hold  - Supine Double Knee to Chest  - 1-2 x daily - 3-4 x weekly - 1 sets - 5 reps - 30 hold  - Supine ITB Stretch with Strap  - 1 x daily - 4-5 x weekly - 1 sets - 5 reps - 30 hold  - Herbert Stretch on Table  - 1 x daily - 4-5 x weekly - 1 sets - 5 reps - 30 hold  - Supine Lower Trunk Rotation  - 1 x daily - 3-4 x weekly - 1 sets - 10 reps - 10 hold  - Supine Posterior Pelvic Tilt  - 1 x daily - 3-4 x weekly - 2 sets - 10 reps - 5-10 hold

## 2024-10-30 ENCOUNTER — TREATMENT (OUTPATIENT)
Dept: PHYSICAL THERAPY | Facility: CLINIC | Age: 69
End: 2024-10-30
Payer: MEDICARE

## 2024-10-30 DIAGNOSIS — G89.29 CHRONIC BILATERAL LOW BACK PAIN WITHOUT SCIATICA: Primary | ICD-10-CM

## 2024-10-30 DIAGNOSIS — M54.50 CHRONIC BILATERAL LOW BACK PAIN WITHOUT SCIATICA: Primary | ICD-10-CM

## 2024-10-30 PROCEDURE — 97110 THERAPEUTIC EXERCISES: CPT | Mod: GP,CQ

## 2024-10-30 ASSESSMENT — PAIN SCALES - GENERAL: PAINLEVEL_OUTOF10: 2

## 2024-10-30 ASSESSMENT — PAIN - FUNCTIONAL ASSESSMENT: PAIN_FUNCTIONAL_ASSESSMENT: 0-10

## 2024-11-06 ENCOUNTER — TREATMENT (OUTPATIENT)
Dept: PHYSICAL THERAPY | Facility: CLINIC | Age: 69
End: 2024-11-06
Payer: MEDICARE

## 2024-11-06 DIAGNOSIS — G89.29 CHRONIC BILATERAL LOW BACK PAIN WITHOUT SCIATICA: Primary | ICD-10-CM

## 2024-11-06 DIAGNOSIS — M54.50 CHRONIC BILATERAL LOW BACK PAIN WITHOUT SCIATICA: Primary | ICD-10-CM

## 2024-11-06 PROCEDURE — 97110 THERAPEUTIC EXERCISES: CPT | Mod: GP,CQ

## 2024-11-06 ASSESSMENT — PAIN SCALES - GENERAL: PAINLEVEL_OUTOF10: 0 - NO PAIN

## 2024-11-06 ASSESSMENT — PAIN - FUNCTIONAL ASSESSMENT: PAIN_FUNCTIONAL_ASSESSMENT: 0-10

## 2024-11-06 NOTE — PROGRESS NOTES
Physical Therapy    Physical Therapy Treatment    Patient Name: Arya Reid  MRN: [id number]  Today's Date: 11/6/2024    Time Entry:   Time Calculation  Start Time: 0933  Stop Time: 1015  Time Calculation (min): 42 min     PT Therapeutic Procedures Time Entry  Therapeutic Exercise Time Entry: 42    Insurance  Insurance reviewed  Name of Insurance:  Medicare  Visit No.  3  Total visits: 8  (EVAL) CHRONIC BILATERAL LBP M54.50; MEDICARE PART A+B: 20% PART B COINSUR // 240 DED // 0 COPAY // 0 OOP // UNLIM V BMN // KX MOD AFTER 20TH VISIT// [employer] SUPP IS SEC TO MEDICARE AND WILL COV PART B COINSUR AFTER DED IS MET [id number] // Larisa confirmed 10/17/24 11:27am                   Assessment:   Level pelvis at end of session. Modified hip flexor stretch to be performed in standing at home. Pt had no complaints of pain at end of session. Pt would benefit from PT to continue to address impairments in order to improve strength, flexibility, gait, and body mechanics and to decrease symptoms and increase overall function.   PT Assessment  PT Assessment Results: Decreased strength, Decreased range of motion, Decreased endurance, Decreased mobility, Pain  Plan:  Continue to progress core strengthening as cass. Monitor pelvis  OP PT Plan  PT Plan: Skilled PTTreatment/Interventions: Cryotherapy, Dry needling, Education/ Instruction, Electrical stimulation, Gait training, Manual therapy, Neuromuscular re-education, Self care/ home management, Taping techniques, Therapeutic activities, Therapeutic exercises, Ultrasound  PT Plan: Skilled PT  PT Frequency: Other (Comment) (1-2x/week)  Duration: 8 visits  Certification Period Start Date: 10/24/24  Certification Period End Date: 01/22/25  Number of Treatments Authorized: 8    Current Problem  1. Chronic bilateral low back pain without sciatica  Follow Up In Physical Therapy            General     General  Referred By: Dr. Shahzad Bradley  General Comment: M54.50,G89.29 (ICD-10-CM) -  Chronic bilateral low back pain without sciatica    Subjective    Pt states that he felt really good after last session but then went home and did some painting. He reports going up and down the ladder multiple times and was really sore afterward. He states that his left gluteal region was sore this morning but no pain currently. Also no right gluteal pain today. Pt states that he has been performing HEP regularly but is unable to obtain a good hip flexor stretch due to his bed being too low.   Precautions  Precautions  STEADI Fall Risk Score (The score of 4 or more indicates an increased risk of falling): 0       Pain  Pain Assessment  Pain Assessment: 0-10  0-10 (Numeric) Pain Score: 0 - No pain  Pain Location: Buttocks  Pain Orientation: Right, Left    Objective       Right anterior innominate rotation corrected with MET     Hamstring flexibility R -20                                    L -32     Educated patient in abdominal brace to promote lumbar/pelvic stability during functional activities           Treatments:     Supine Piriformis Stretch with Foot on Ground 30 sec x 2  Supine Single Knee to Chest Stretch 30 sec x 1 ea    Supine Double Knee to Chest  30 sec x 2   Supine ITB Stretch with Strap 30 sec x 3   Herbert Stretch on Table R 30 sec x 1  Standing hip flexor stretch at wall R 30 sec x 2   Supine Lower Trunk Rotation x10   Supine Posterior Pelvic Tilt 5 sec x 10   DLS march with abd brace x 10   Long sitting hamstring stretch L 30 sec x 3  Supine bent knee fallout x10 B  OP EDUCATION:    11/6/24: Added L hamstring stretch and standing R hip flexor stretch to HEP    10/30/24: Added resisted hip abd/add and DLS march to HEP     Goals:  By the end of 8 visits patient will be able to do the following with < 0-1/10 bilateral glute pain:     HEP:  Patient will consistently perform HIS home exercise program for 20-30 minute sessions, 1-2x/day, 3-4 days/week independently by the end of 8 visits.     Basic  ADL's:   Patient will perform bADL's/instrumental ADL's for 30 minutes moving between various closed kinetic chain postures.     ROM and Strength:  Patient will demonstrate 5/5 R HIP and L HIP strength in all planes and WNL's LUMBAR SPINE, R HIP, and L HIP AROM in all planes to improve their ability to lift, stand, ambulate and perform basic ADL's.     Stair Negotiation:  Patient will be able to ambulate up/down stairs for 1-2 flights at a time.     Gait/Locomotion:  Patient will be able to ambulate for 30-60 minutes at a time.  Minimal to no gait deviation across level ground/stairs.     Sleep:  Patient will sleep thru the night 4/7 nights/week.     Participation restrictions:  Decrease Oswestry to < or = to 15% for increased functional ability.     Pain:  Decrease pain at worst to < or = to 0-1/10 for improved QOL and ability to sleep.     No point tenderness noted over the glutes.

## 2024-11-13 ENCOUNTER — TREATMENT (OUTPATIENT)
Dept: PHYSICAL THERAPY | Facility: CLINIC | Age: 69
End: 2024-11-13
Payer: MEDICARE

## 2024-11-13 DIAGNOSIS — M54.50 CHRONIC BILATERAL LOW BACK PAIN WITHOUT SCIATICA: Primary | ICD-10-CM

## 2024-11-13 DIAGNOSIS — G89.29 CHRONIC BILATERAL LOW BACK PAIN WITHOUT SCIATICA: Primary | ICD-10-CM

## 2024-11-13 PROCEDURE — 97110 THERAPEUTIC EXERCISES: CPT | Mod: GP,CQ

## 2024-11-13 ASSESSMENT — PAIN SCALES - GENERAL: PAINLEVEL_OUTOF10: 3

## 2024-11-13 ASSESSMENT — PAIN - FUNCTIONAL ASSESSMENT: PAIN_FUNCTIONAL_ASSESSMENT: 0-10

## 2024-11-13 NOTE — PROGRESS NOTES
Physical Therapy    Physical Therapy Treatment    Patient Name: Arya Reid  MRN: [id number]  Today's Date: 11/13/2024    Time Entry:   Time Calculation  Start Time: 1018  Stop Time: 1102  Time Calculation (min): 44 min     PT Therapeutic Procedures Time Entry  Therapeutic Exercise Time Entry: 44    Insurance  Insurance reviewed  Name of Insurance:  Medicare  Visit No.  4  Total visits: 8  (EVAL) CHRONIC BILATERAL LBP M54.50; MEDICARE PART A+B: 20% PART B COINSUR // 240 DED // 0 COPAY // 0 OOP // UNLIM V BMN // KX MOD AFTER 20TH VISIT// [employer] SUPP IS SEC TO MEDICARE AND WILL COV PART B COINSUR AFTER DED IS MET [id number] // Larisa confirmed 10/17/24 11:27am                   Assessment:   Level pelvis at end of session. Pt had no complaints of increased pain during or after exercises and reported slight decrease in low back pain at end of session. Pt would benefit from PT to continue to address impairments in order to improve strength, flexibility, gait, and body mechanics and to decrease symptoms and increase overall function.   PT Assessment  PT Assessment Results: Decreased strength, Decreased range of motion, Decreased endurance, Decreased mobility, Pain  Plan:  Continue to progress core strengthening as cass. Monitor pelvis  OP PT Plan  PT Plan: Skilled PTTreatment/Interventions: Cryotherapy, Dry needling, Education/ Instruction, Electrical stimulation, Gait training, Manual therapy, Neuromuscular re-education, Self care/ home management, Taping techniques, Therapeutic activities, Therapeutic exercises, Ultrasound  PT Plan: Skilled PT  PT Frequency: Other (Comment) (1-2x/week)  Duration: 8 visits  Certification Period Start Date: 10/24/24  Certification Period End Date: 01/22/25  Number of Treatments Authorized: 8    Current Problem  1. Chronic bilateral low back pain without sciatica  Follow Up In Physical Therapy              General     General  Referred By: Dr. Shahzad Bradley  General Comment:  M54.50,G89.29 (ICD-10-CM) - Chronic bilateral low back pain without sciatica    Subjective    Pt states that he took his garden down yesterday so was doing a lot of bending over and his lower back is bothering him a little this morning. He denies pain in buttocks today.   Precautions  Precautions  STEADI Fall Risk Score (The score of 4 or more indicates an increased risk of falling): 0       Pain  Pain Assessment  Pain Assessment: 0-10  0-10 (Numeric) Pain Score: 3  Pain Location: Back  Pain Orientation: Right, Left    Objective         Level pelvis upon entrance                   Treatments:     Supine Piriformis Stretch with Foot on Ground 30 sec x 2  Supine Single Knee to Chest Stretch 30 sec x 1 ea NT   Supine Double Knee to Chest  30 sec x 2 NT  Supine ITB Stretch with Strap 30 sec x 3   Herbert Stretch on Table R 30 sec x 1 NT  Standing hip flexor stretch at wall R 30 sec x 2   Supine Lower Trunk Rotation x10   Supine Posterior Pelvic Tilt 5 sec x 10   DLS march with abd brace x 10   Long sitting hamstring stretch L 30 sec x 2  Supine bent knee fallout x10 B  Pulldowns with abd brace GTT x15  Alt UE extension GTT x 10  Rows with abd brace NavyTT x 15    Pallof press GTT x 15   OP EDUCATION:  11/13/24: Added pulldowns, alt UE extension, rows, and pallof press with abdominal brace/theraband to HEP   11/6/24: Added L hamstring stretch and standing R hip flexor stretch to HEP    10/30/24: Added resisted hip abd/add and DLS march to HEP     Goals:  By the end of 8 visits patient will be able to do the following with < 0-1/10 bilateral glute pain:     HEP:  Patient will consistently perform HIS home exercise program for 20-30 minute sessions, 1-2x/day, 3-4 days/week independently by the end of 8 visits.     Basic ADL's:   Patient will perform bADL's/instrumental ADL's for 30 minutes moving between various closed kinetic chain postures.     ROM and Strength:  Patient will demonstrate 5/5 R HIP and L HIP strength in  all planes and WNL's LUMBAR SPINE, R HIP, and L HIP AROM in all planes to improve their ability to lift, stand, ambulate and perform basic ADL's.     Stair Negotiation:  Patient will be able to ambulate up/down stairs for 1-2 flights at a time.     Gait/Locomotion:  Patient will be able to ambulate for 30-60 minutes at a time.  Minimal to no gait deviation across level ground/stairs.     Sleep:  Patient will sleep thru the night 4/7 nights/week.     Participation restrictions:  Decrease Oswestry to < or = to 15% for increased functional ability.     Pain:  Decrease pain at worst to < or = to 0-1/10 for improved QOL and ability to sleep.     No point tenderness noted over the glutes.

## 2024-11-20 ENCOUNTER — TREATMENT (OUTPATIENT)
Dept: PHYSICAL THERAPY | Facility: CLINIC | Age: 69
End: 2024-11-20
Payer: MEDICARE

## 2024-11-20 DIAGNOSIS — G89.29 CHRONIC BILATERAL LOW BACK PAIN WITHOUT SCIATICA: Primary | ICD-10-CM

## 2024-11-20 DIAGNOSIS — M54.50 CHRONIC BILATERAL LOW BACK PAIN WITHOUT SCIATICA: Primary | ICD-10-CM

## 2024-11-20 PROCEDURE — 97110 THERAPEUTIC EXERCISES: CPT | Mod: GP,CQ

## 2024-11-20 ASSESSMENT — PAIN SCALES - GENERAL: PAINLEVEL_OUTOF10: 4

## 2024-11-20 ASSESSMENT — PAIN - FUNCTIONAL ASSESSMENT: PAIN_FUNCTIONAL_ASSESSMENT: 0-10

## 2024-11-20 NOTE — PROGRESS NOTES
Physical Therapy    Physical Therapy Treatment    Patient Name: Arya Reid  MRN: [id number]  Today's Date: 11/20/2024    Time Entry:   Time Calculation  Start Time: 1017  Stop Time: 1100  Time Calculation (min): 43 min     PT Therapeutic Procedures Time Entry  Therapeutic Exercise Time Entry: 43    Insurance  Insurance reviewed  Name of Insurance:  Medicare  Visit No.  5  Total visits: 8  (EVAL) CHRONIC BILATERAL LBP M54.50; MEDICARE PART A+B: 20% PART B COINSUR // 240 DED // 0 COPAY // 0 OOP // UNLIM V BMN // KX MOD AFTER 20TH VISIT// [employer] SUPP IS SEC TO MEDICARE AND WILL COV PART B COINSUR AFTER DED IS MET [id number] // Larisa confirmed 10/17/24 11:27am                   Assessment:  Level pelvis at end of session. Pt reported decreased pain in low back following treatment. He would benefit from PT to continue to address impairments in order to improve strength, flexibility, gait, and body mechanics and to decrease symptoms and increase overall function.   PT Assessment  PT Assessment Results: Decreased strength, Decreased range of motion, Decreased endurance, Decreased mobility, Pain  Plan:  Continue to progress core strengthening as cass. Monitor pelvis  OP PT Plan  PT Plan: Skilled PTTreatment/Interventions: Cryotherapy, Dry needling, Education/ Instruction, Electrical stimulation, Gait training, Manual therapy, Neuromuscular re-education, Self care/ home management, Taping techniques, Therapeutic activities, Therapeutic exercises, Ultrasound  PT Plan: Skilled PT  PT Frequency: Other (Comment) (1-2x/week)  Duration: 8 visits  Certification Period Start Date: 10/24/24  Certification Period End Date: 01/22/25  Number of Treatments Authorized: 8    Current Problem  1. Chronic bilateral low back pain without sciatica  Follow Up In Physical Therapy                General     General  Referred By: Dr. Shahzad Bradley  General Comment: M54.50,G89.29 (ICD-10-CM) - Chronic bilateral low back pain without  sciatica    Subjective    Pt states that he has had some soreness in his glutes this week. He reports doing a lot of yard work/climbing ladders, etc over the past week. He states that he woke up with his lower back hurting a little more this morning.   Precautions  Precautions  STEADI Fall Risk Score (The score of 4 or more indicates an increased risk of falling): 0       Pain  Pain Assessment  Pain Assessment: 0-10  0-10 (Numeric) Pain Score: 4  Pain Location: Back  Pain Orientation: Right, Left    Objective           Right anterior innominate rotation corrected with MET                 Treatments:     Supine Piriformis Stretch with Foot on Ground 30 sec x 2  Supine Single Knee to Chest Stretch 30 sec x 1 ea NT   Supine Double Knee to Chest  30 sec x 2 NT  Supine ITB Stretch with Strap 30 sec x 3   Herbert Stretch on Table R 30 sec x 1 NT  Standing hip flexor stretch at wall R 30 sec x 2   Supine Lower Trunk Rotation x10  Supine Posterior Pelvic Tilt 5 sec x 10   DLS march with abd brace x 10   Long sitting hamstring stretch L 30 sec x 2  Supine bent knee fallout x10 B  Pulldowns with abd brace GTT x15  Alt UE extension GTT x 15  Rows with abd brace GTT x 15    Pallof press GTT x 10  Resisted hip abd/add  black band/pink ball x 10 ea   Recumbant bike 5 min Level 0   Seated Lumbar flexion with GSB 5 sec x 10  OP EDUCATION:  11/13/24: Added pulldowns, alt UE extension, rows, and pallof press with abdominal brace/theraband to HEP   11/6/24: Added L hamstring stretch and standing R hip flexor stretch to HEP    10/30/24: Added resisted hip abd/add and DLS march to HEP     Goals:  By the end of 8 visits patient will be able to do the following with < 0-1/10 bilateral glute pain:     HEP:  Patient will consistently perform HIS home exercise program for 20-30 minute sessions, 1-2x/day, 3-4 days/week independently by the end of 8 visits.     Basic ADL's:   Patient will perform bADL's/instrumental ADL's for 30 minutes moving  between various closed kinetic chain postures.     ROM and Strength:  Patient will demonstrate 5/5 R HIP and L HIP strength in all planes and WNL's LUMBAR SPINE, R HIP, and L HIP AROM in all planes to improve their ability to lift, stand, ambulate and perform basic ADL's.     Stair Negotiation:  Patient will be able to ambulate up/down stairs for 1-2 flights at a time.     Gait/Locomotion:  Patient will be able to ambulate for 30-60 minutes at a time.  Minimal to no gait deviation across level ground/stairs.     Sleep:  Patient will sleep thru the night 4/7 nights/week.     Participation restrictions:  Decrease Oswestry to < or = to 15% for increased functional ability.     Pain:  Decrease pain at worst to < or = to 0-1/10 for improved QOL and ability to sleep.     No point tenderness noted over the glutes.

## 2024-11-27 ENCOUNTER — TREATMENT (OUTPATIENT)
Dept: PHYSICAL THERAPY | Facility: CLINIC | Age: 69
End: 2024-11-27
Payer: MEDICARE

## 2024-11-27 DIAGNOSIS — M54.50 CHRONIC BILATERAL LOW BACK PAIN WITHOUT SCIATICA: ICD-10-CM

## 2024-11-27 DIAGNOSIS — G89.29 CHRONIC BILATERAL LOW BACK PAIN WITHOUT SCIATICA: ICD-10-CM

## 2024-11-27 PROCEDURE — 97110 THERAPEUTIC EXERCISES: CPT | Mod: GP | Performed by: PHYSICAL THERAPIST

## 2024-11-27 ASSESSMENT — PAIN - FUNCTIONAL ASSESSMENT: PAIN_FUNCTIONAL_ASSESSMENT: 0-10

## 2024-11-27 ASSESSMENT — PAIN SCALES - GENERAL: PAINLEVEL_OUTOF10: 1

## 2024-11-27 NOTE — PROGRESS NOTES
PHYSICAL THERAPY TREATMENT    Patient name:  Arya Reid    MRN:  [id number]    :  1955    Today's Date:  24    Time Calculation  Start Time: 1000  Stop Time: 1045  Time Calculation (min): 45 min     PT Therapeutic Procedures Time Entry  Therapeutic Exercise Time Entry: 38       Referral by:  Referred By: Dr. Shahzad Bradley    Diagnoses:  Diagnosis and Precautions: M54.50,G89.29 (ICD-10-CM) - Chronic bilateral low back pain without sciatica    Assessment/Plan:  Therapeutic exercise performed in order to improve core strength and hip/lumbar mobility.  Patient requires increased tactile/verbal cues with exercise in order to reduce hip/lumbar stress/strain during the particular exercise performed.  Patient challenged with exercises performed in clinic secondary to core fatigue.   PT Assessment  PT Assessment Results: Decreased strength, Decreased range of motion, Decreased endurance, Decreased mobility, Pain  Rehab Prognosis: Good  OP PT Plan  PT Plan: Skilled PT  Rehab Potential: Good  Plan of Care Agreement: Patient    General Visit Information  PT  Visit  PT Received On: 24    General  Reason for Referral: PT Evaluation  Referred By: Dr. Shahzad Bradley  General Comment: M54.50,G89.29 (ICD-10-CM) - Chronic bilateral low back pain without sciatica    Insurance  Insurance reviewed  Name of Insurance:  Medicare  Visit No.    CHRONIC BILATERAL LBP M54.50; MEDICARE PART A+B: 20% PART B COINSUR // 240 DED // 0 COPAY // 0 OOP // UNLIM V BMN // KX MOD AFTER  VISIT// [employer] SUPP IS SEC TO MEDICARE AND WILL COV PART B COINSUR AFTER DED IS MET [id number] // Larisa confirmed 10/17/24 11:27am     Subjective:  Patient reports that his glute and lower back pain are feeling better.    Precautions  STEADI Fall Risk Score (The score of 4 or more indicates an increased risk of falling): 0  Medical Precautions:  (diabetes, history of neck surgery, L shoulder surgery, R shoulder surgery)    Pain:  Pain  "Assessment  Pain Assessment: 0-10  0-10 (Numeric) Pain Score: 1  Pain Location: Back  Pain Orientation: Right, Left    Treatments    Therapeutic Exercise  Therapeutic Exercise Performed: Yes  Therapeutic Exercise Activity 1: recumbent bike x 7 minutes, level 6 resistance  Therapeutic Exercise Activity 2: supine LTR stretch 10\"x10 reps R and L  Therapeutic Exercise Activity 3: supine SKTC stretch 30\"x5  Therapeutic Exercise Activity 4: supine DKTC stretch 30\"x5  Therapeutic Exercise Activity 5: supine piriformis stretch 30\"x5  Therapeutic Exercise Activity 6: supine marching x 20 reps  Therapeutic Exercise Activity 7: supine heel walk x 20 reps  Therapeutic Exercise Activity 8: supine reverse crunch x 20 reps  Therapeutic Exercise Activity 9: supine bicycle x 20 reps  Therapeutic Exercise Activity 10: standing hip flexor stretch 30\"x5  Therapeutic Exercise Activity 11: trigger point release L hip flexor    Treatment  Time in clinic started at:   10am  Time in clinic ended at:   10:45am  Total time in clinic is:   45 minutes  Total timed code time is:  38 minutes    Treatment Performed Today:.   Therapeutic Exercise x  3 units  "

## 2024-12-04 ENCOUNTER — APPOINTMENT (OUTPATIENT)
Dept: PHYSICAL THERAPY | Facility: CLINIC | Age: 69
End: 2024-12-04
Payer: MEDICARE

## 2024-12-10 ENCOUNTER — APPOINTMENT (OUTPATIENT)
Dept: DERMATOLOGY | Facility: CLINIC | Age: 69
End: 2024-12-10
Payer: MEDICARE

## 2024-12-10 DIAGNOSIS — E11.9 TYPE 2 DIABETES MELLITUS WITHOUT COMPLICATION, WITHOUT LONG-TERM CURRENT USE OF INSULIN (MULTI): ICD-10-CM

## 2024-12-10 RX ORDER — GABAPENTIN 100 MG/1
100 CAPSULE ORAL 3 TIMES DAILY
Qty: 90 CAPSULE | Refills: 1 | Status: SHIPPED | OUTPATIENT
Start: 2024-12-10

## 2024-12-10 NOTE — TELEPHONE ENCOUNTER
Rx Refill Request     Name: Arya Archibaldough  :  1955   Medication Name:  gabapentin   Specific Pharmacy location:  [facility]   Date of last appointment:  10/4/2024   Date of next appointment:  2025   Best number to reach patient:  [phone]

## 2024-12-11 ENCOUNTER — TREATMENT (OUTPATIENT)
Dept: PHYSICAL THERAPY | Facility: CLINIC | Age: 69
End: 2024-12-11
Payer: MEDICARE

## 2024-12-11 DIAGNOSIS — M54.50 CHRONIC BILATERAL LOW BACK PAIN WITHOUT SCIATICA: Primary | ICD-10-CM

## 2024-12-11 DIAGNOSIS — G89.29 CHRONIC BILATERAL LOW BACK PAIN WITHOUT SCIATICA: Primary | ICD-10-CM

## 2024-12-11 PROCEDURE — 97110 THERAPEUTIC EXERCISES: CPT | Mod: GP,CQ

## 2024-12-11 ASSESSMENT — PAIN - FUNCTIONAL ASSESSMENT: PAIN_FUNCTIONAL_ASSESSMENT: 0-10

## 2024-12-11 ASSESSMENT — PAIN SCALES - GENERAL: PAINLEVEL_OUTOF10: 3

## 2024-12-11 NOTE — PROGRESS NOTES
Physical Therapy    Physical Therapy Treatment    Patient Name: Arya Reid  MRN: [id number]  Today's Date: 12/11/2024    Time Entry:   Time Calculation  Start Time: 1018  Stop Time: 1100  Time Calculation (min): 42 min     PT Therapeutic Procedures Time Entry  Therapeutic Exercise Time Entry: 42    Insurance  Insurance reviewed  Name of Insurance:  Medicare  Visit No.  7  Total visits: 8  (EVAL) CHRONIC BILATERAL LBP M54.50; MEDICARE PART A+B: 20% PART B COINSUR // 240 DED // 0 COPAY // 0 OOP // UNLIM V BMN // KX MOD AFTER 20TH VISIT// [employer] SUPP IS SEC TO MEDICARE AND WILL COV PART B COINSUR AFTER DED IS MET [id number] // Larisa confirmed 10/17/24 11:27am                   Assessment:  Level pelvis at end of session. Pt reported minimal muscle soreness in left buttock at end of session but no pain. He reports feeling better than when he arrived. He would benefit from PT to continue to address impairments in order to improve strength, flexibility, gait, and body mechanics and to decrease symptoms and increase overall function.   PT Assessment  PT Assessment Results: Decreased strength, Decreased range of motion, Decreased endurance, Decreased mobility, Pain  Plan:  Continue to progress core strengthening as cass. Monitor pelvis  OP PT Plan  PT Plan: Skilled PTTreatment/Interventions: Cryotherapy, Dry needling, Education/ Instruction, Electrical stimulation, Gait training, Manual therapy, Neuromuscular re-education, Self care/ home management, Taping techniques, Therapeutic activities, Therapeutic exercises, Ultrasound  PT Plan: Skilled PT  PT Frequency: Other (Comment) (1-2x/week)  Duration: 8 visits  Certification Period Start Date: 10/24/24  Certification Period End Date: 01/22/25  Number of Treatments Authorized: 8    Current Problem  1. Chronic bilateral low back pain without sciatica  Follow Up In Physical Therapy                  General     General  Referred By: Dr. Shahzad Bradley  General Comment:  M54.50,G89.29 (ICD-10-CM) - Chronic bilateral low back pain without sciatica    Subjective    Pt states that he is a little sore today. He reports just returning from trip to [place] on 12/9/24 which was a 14 hour drive. He had some discomfort but did not feel the need to stop due to pain. Heated seats really helped. Pain is still inconsistent as some days he feels pain in his back and other days it is in his buttocks. Today, pain is only in left buttock.   Precautions  Precautions  STEADI Fall Risk Score (The score of 4 or more indicates an increased risk of falling): 0  Medical Precautions:  (diabetes, history of neck surgery, L shoulder surgery, R shoulder surgery)       Pain  Pain Assessment  Pain Assessment: 0-10  0-10 (Numeric) Pain Score: 3  Pain Location: Buttocks  Pain Orientation: Left    Objective       Level pelvis upon entrance                   Treatments:     Supine Piriformis Stretch with Foot on Ground 30 sec x 3  Supine Single Knee to Chest Stretch 30 sec x 1 ea NT   Supine Double Knee to Chest  30 sec x 2 NT  Supine ITB Stretch with Strap 30 sec x 3 NT  Herbert Stretch on Table R 30 sec x 1 NT  Standing hip flexor stretch at wall B 30 sec x 2 NT  Supine Lower Trunk Rotation x10  Supine Posterior Pelvic Tilt 5 sec x 10   DLS march with abd brace x 10   Long sitting hamstring stretch B 30 sec x 3  Supine bent knee fallout x10 B  Pulldowns with abd brace GTT x15  Alt UE extension GTT x 15  Rows with abd brace GTT x 15    Pallof press GTT x 15  Resisted hip abd/add  black band/pink ball x 10 ea   Recumbant bike 5 min Level 6   Seated Lumbar flexion with GSB 5 sec x 10 NT  supine bicycle x 10 reps   OP EDUCATION:  11/13/24: Added pulldowns, alt UE extension, rows, and pallof press with abdominal brace/theraband to HEP   11/6/24: Added L hamstring stretch and standing R hip flexor stretch to HEP    10/30/24: Added resisted hip abd/add and DLS march to HEP     Goals:  By the end of 8 visits patient will  be able to do the following with < 0-1/10 bilateral glute pain:     HEP:  Patient will consistently perform HIS home exercise program for 20-30 minute sessions, 1-2x/day, 3-4 days/week independently by the end of 8 visits.     Basic ADL's:   Patient will perform bADL's/instrumental ADL's for 30 minutes moving between various closed kinetic chain postures.     ROM and Strength:  Patient will demonstrate 5/5 R HIP and L HIP strength in all planes and WNL's LUMBAR SPINE, R HIP, and L HIP AROM in all planes to improve their ability to lift, stand, ambulate and perform basic ADL's.     Stair Negotiation:  Patient will be able to ambulate up/down stairs for 1-2 flights at a time.     Gait/Locomotion:  Patient will be able to ambulate for 30-60 minutes at a time.  Minimal to no gait deviation across level ground/stairs.     Sleep:  Patient will sleep thru the night 4/7 nights/week.     Participation restrictions:  Decrease Oswestry to < or = to 15% for increased functional ability.     Pain:  Decrease pain at worst to < or = to 0-1/10 for improved QOL and ability to sleep.     No point tenderness noted over the glutes.

## 2024-12-17 ENCOUNTER — TREATMENT (OUTPATIENT)
Dept: PHYSICAL THERAPY | Facility: CLINIC | Age: 69
End: 2024-12-17
Payer: MEDICARE

## 2024-12-17 DIAGNOSIS — G89.29 CHRONIC BILATERAL LOW BACK PAIN WITHOUT SCIATICA: ICD-10-CM

## 2024-12-17 DIAGNOSIS — M54.50 CHRONIC BILATERAL LOW BACK PAIN WITHOUT SCIATICA: ICD-10-CM

## 2024-12-17 PROCEDURE — 97110 THERAPEUTIC EXERCISES: CPT | Mod: GP | Performed by: PHYSICAL THERAPIST

## 2024-12-17 ASSESSMENT — PAIN - FUNCTIONAL ASSESSMENT: PAIN_FUNCTIONAL_ASSESSMENT: 0-10

## 2024-12-17 ASSESSMENT — PAIN SCALES - GENERAL: PAINLEVEL_OUTOF10: 3

## 2024-12-17 NOTE — PROGRESS NOTES
PHYSICAL THERAPY TREATMENT    Patient name:  Arya Reid    MRN:  [id number]    :  1955    Today's Date:  24    Time Calculation  Start Time: 1000  Stop Time: 1020  Time Calculation (min): 20 min     PT Therapeutic Procedures Time Entry  Therapeutic Exercise Time Entry: 20       Referral by:  Referred By: Dr. Shahzad Bradley    Diagnoses:  Diagnosis and Precautions: M54.50,G89.29 (ICD-10-CM) - Chronic bilateral low back pain without sciatica    Goals:   By the end of 8 visits patient will be able to do the following with < 0-1/10 bilateral glute pain:    HEP:  Patient will consistently perform HIS home exercise program for 20-30 minute sessions, 1-2x/day, 3-4 days/week independently by the end of 8 visits.  progressing    Basic ADL's:   Patient will perform bADL's/instrumental ADL's for 30 minutes moving between various closed kinetic chain postures.  Minimal change    ROM and Strength:  Patient will demonstrate 5/5 R HIP and L HIP strength in all planes and WNL's LUMBAR SPINE, R HIP, and L HIP AROM in all planes to improve their ability to lift, stand, ambulate and perform basic ADL's.  Minimal change    Stair Negotiation:  Patient will be able to ambulate up/down stairs for 1-2 flights at a time.  Minimal change    Gait/Locomotion:  Patient will be able to ambulate for 30-60 minutes at a time.  Minimal change  Minimal to no gait deviation across level ground/stairs.    Sleep:  Patient will sleep thru the night 4/7 nights/week.  Minimal change    Participation restrictions:  Decrease Oswestry to < or = to 15% for increased functional ability.  worse    Pain:  Decrease pain at worst to < or = to 0-1/10 for improved QOL and ability to sleep.  Minimal change    No point tenderness noted over the glutes.  Minimal change    Assessment/Plan:  Patient comes into clinic today for PT Re-Evaluation secondary to complaints of lower back pain.  Patient demonstrates minimal overall change with his lower back  rehab program thus far due to elevated pain levels.  Due to minimal overall change due to elevated pain levels and minimal functional progression due to elevated pain levels am recommending patient follow-up with his physician for other treatment considerations (Orthopedic, chiropractic, pain management) and also get the lumbar x-ray that is in the system since 10/2024. Patient to be discharged from formal PT.  PT Assessment  PT Assessment Results: Decreased strength, Decreased range of motion, Decreased endurance, Decreased mobility, Pain  Rehab Prognosis: Good  Evaluation/Treatment Tolerance: Patient limited by pain  OP PT Plan  PT Plan: No Additional PT interventions required at this time  PT Frequency:  (DC)  Duration: DC  Rehab Potential: Good  Plan of Care Agreement: Patient    General Visit Information  PT  Visit  PT Received On: 12/17/24    General  Reason for Referral: PT Evaluation  Referred By: Dr. Shahzad Bradley  General Comment: M54.50,G89.29 (ICD-10-CM) - Chronic bilateral low back pain without sciatica    Insurance  Insurance reviewed  Name of Insurance:  Medicare  Visit No.  8/8  CHRONIC BILATERAL LBP M54.50; MEDICARE PART A+B: 20% PART B COINSUR // 240 DED // 0 COPAY // 0 OOP // UNLIM V BMN // KX MOD AFTER 20TH VISIT// [employer] SUPP IS SEC TO MEDICARE AND WILL COV PART B COINSUR AFTER DED IS MET [id number] // Larisa confirmed 10/17/24 11:27am     Subjective:  Patient comes into clinic today for PT Re-Evaluation secondary to complaints of lower back pain.  Patient to follow-up with MD on 2/4/2025.  Patient reports that overall his lower back feels 20% improved with regards to performing basic ADL's.    Precautions  STEADI Fall Risk Score (The score of 4 or more indicates an increased risk of falling): 0  Medical Precautions:  (diabetes, history of neck surgery, L shoulder surgery, R shoulder surgery)    Pain:  Pain Assessment  Pain Assessment: 0-10  0-10 (Numeric) Pain Score: 3 (5/10 worst, 1/10  best)  Pain Location: Back    Objective:    Weightbearing Status:  FWB    Skin:  skin intact over lower back    Palpation:   point tenderness over bilateral upper glutes, piriformis    Sensation:  Patient denies numbness/tingling of bilateral LOWER extremities.    Gait:  Mild antalgic gait L LE    ROM:  AROM  Lumbar flexion WNL's  Extension limited and painful  Rotation 25% limited R and L  SB limited and painful R and L    Strength:    Bilateral hips 4/5 all planes    Outcome measure  Oswestry Disablity Index (BONNIE): 38%     Treatments    Therapeutic Exercise  Therapeutic Exercise Activity 1: Re-Evaluation    Treatment  Time in clinic started at:   10am  Time in clinic ended at:   10:20am  Total time in clinic is:    20 minutes  Total timed code time is:  20 minutes    Treatment Performed Today:.   Therapeutic Exercise x  1 unit

## 2024-12-27 ENCOUNTER — APPOINTMENT (OUTPATIENT)
Dept: PHYSICAL THERAPY | Facility: CLINIC | Age: 69
End: 2024-12-27
Payer: MEDICARE

## 2024-12-27 DIAGNOSIS — M54.50 CHRONIC BILATERAL LOW BACK PAIN WITHOUT SCIATICA: ICD-10-CM

## 2024-12-27 DIAGNOSIS — G89.29 CHRONIC BILATERAL LOW BACK PAIN WITHOUT SCIATICA: ICD-10-CM

## 2025-01-07 DIAGNOSIS — I10 HTN (HYPERTENSION), BENIGN: ICD-10-CM

## 2025-01-07 RX ORDER — LISINOPRIL 20 MG/1
20 TABLET ORAL DAILY
Qty: 90 TABLET | Refills: 1 | Status: SHIPPED | OUTPATIENT
Start: 2025-01-07

## 2025-01-07 NOTE — TELEPHONE ENCOUNTER
Rx Refill Request     Name: Arya Reid  :  1955   Medication Name:  lisinopril  Specific Pharmacy location:  [facility]   Date of last appointment:  10/4/2024   Date of next appointment:  2025   Best number to reach patient:  [phone]

## 2025-01-31 DIAGNOSIS — E29.1 HYPOGONADISM MALE: Primary | ICD-10-CM

## 2025-02-01 LAB
ALBUMIN SERPL-MCNC: 5 G/DL (ref 3.6–5.1)
ALBUMIN/CREAT UR: 34 MG/G CREAT
ALP SERPL-CCNC: 54 U/L (ref 35–144)
ALT SERPL-CCNC: 15 U/L (ref 9–46)
ANION GAP SERPL CALCULATED.4IONS-SCNC: 11 MMOL/L (CALC) (ref 7–17)
AST SERPL-CCNC: 16 U/L (ref 10–35)
BASOPHILS # BLD AUTO: 92 CELLS/UL (ref 0–200)
BASOPHILS NFR BLD AUTO: 1.7 %
BILIRUB SERPL-MCNC: 0.5 MG/DL (ref 0.2–1.2)
BUN SERPL-MCNC: 20 MG/DL (ref 7–25)
CALCIUM SERPL-MCNC: 9.9 MG/DL (ref 8.6–10.3)
CHLORIDE SERPL-SCNC: 97 MMOL/L (ref 98–110)
CHOLEST SERPL-MCNC: 204 MG/DL
CHOLEST/HDLC SERPL: 2.4 (CALC)
CO2 SERPL-SCNC: 27 MMOL/L (ref 20–32)
CREAT SERPL-MCNC: 0.65 MG/DL (ref 0.7–1.35)
CREAT UR-MCNC: 73 MG/DL (ref 20–320)
EGFRCR SERPLBLD CKD-EPI 2021: 103 ML/MIN/1.73M2
EOSINOPHIL # BLD AUTO: 302 CELLS/UL (ref 15–500)
EOSINOPHIL NFR BLD AUTO: 5.6 %
ERYTHROCYTE [DISTWIDTH] IN BLOOD BY AUTOMATED COUNT: 13.1 % (ref 11–15)
EST. AVERAGE GLUCOSE BLD GHB EST-MCNC: 128 MG/DL
EST. AVERAGE GLUCOSE BLD GHB EST-SCNC: 7.1 MMOL/L
GLUCOSE SERPL-MCNC: 145 MG/DL (ref 65–99)
HBA1C MFR BLD: 6.1 % OF TOTAL HGB
HCT VFR BLD AUTO: 42.2 % (ref 38.5–50)
HDLC SERPL-MCNC: 84 MG/DL
HGB BLD-MCNC: 14.3 G/DL (ref 13.2–17.1)
LDLC SERPL CALC-MCNC: 104 MG/DL (CALC)
LYMPHOCYTES # BLD AUTO: 1296 CELLS/UL (ref 850–3900)
LYMPHOCYTES NFR BLD AUTO: 24 %
MCH RBC QN AUTO: 33.6 PG (ref 27–33)
MCHC RBC AUTO-ENTMCNC: 33.9 G/DL (ref 32–36)
MCV RBC AUTO: 99.1 FL (ref 80–100)
MICROALBUMIN UR-MCNC: 2.5 MG/DL
MONOCYTES # BLD AUTO: 616 CELLS/UL (ref 200–950)
MONOCYTES NFR BLD AUTO: 11.4 %
NEUTROPHILS # BLD AUTO: 3094 CELLS/UL (ref 1500–7800)
NEUTROPHILS NFR BLD AUTO: 57.3 %
NONHDLC SERPL-MCNC: 120 MG/DL (CALC)
PLATELET # BLD AUTO: 258 THOUSAND/UL (ref 140–400)
PMV BLD REES-ECKER: 10.7 FL (ref 7.5–12.5)
POTASSIUM SERPL-SCNC: 4.7 MMOL/L (ref 3.5–5.3)
PROT SERPL-MCNC: 7 G/DL (ref 6.1–8.1)
RBC # BLD AUTO: 4.26 MILLION/UL (ref 4.2–5.8)
SODIUM SERPL-SCNC: 135 MMOL/L (ref 135–146)
TRIGL SERPL-MCNC: 75 MG/DL
WBC # BLD AUTO: 5.4 THOUSAND/UL (ref 3.8–10.8)

## 2025-02-03 ENCOUNTER — HOSPITAL ENCOUNTER (OUTPATIENT)
Dept: RADIOLOGY | Facility: HOSPITAL | Age: 70
Discharge: HOME | End: 2025-02-03
Payer: MEDICARE

## 2025-02-03 DIAGNOSIS — M54.50 CHRONIC BILATERAL LOW BACK PAIN WITHOUT SCIATICA: ICD-10-CM

## 2025-02-03 DIAGNOSIS — G89.29 CHRONIC BILATERAL LOW BACK PAIN WITHOUT SCIATICA: ICD-10-CM

## 2025-02-03 PROCEDURE — 72110 X-RAY EXAM L-2 SPINE 4/>VWS: CPT | Performed by: RADIOLOGY

## 2025-02-03 PROCEDURE — 72110 X-RAY EXAM L-2 SPINE 4/>VWS: CPT

## 2025-02-04 ENCOUNTER — APPOINTMENT (OUTPATIENT)
Dept: PRIMARY CARE | Facility: CLINIC | Age: 70
End: 2025-02-04
Payer: MEDICARE

## 2025-02-04 VITALS
OXYGEN SATURATION: 95 % | BODY MASS INDEX: 20.99 KG/M2 | DIASTOLIC BLOOD PRESSURE: 82 MMHG | HEIGHT: 70 IN | TEMPERATURE: 97.3 F | WEIGHT: 146.6 LBS | HEART RATE: 82 BPM | RESPIRATION RATE: 16 BRPM | SYSTOLIC BLOOD PRESSURE: 140 MMHG

## 2025-02-04 DIAGNOSIS — N52.9 ERECTILE DYSFUNCTION, UNSPECIFIED ERECTILE DYSFUNCTION TYPE: ICD-10-CM

## 2025-02-04 DIAGNOSIS — E78.2 MIXED HYPERLIPIDEMIA: ICD-10-CM

## 2025-02-04 DIAGNOSIS — Z13.6 SCREENING FOR AAA (ABDOMINAL AORTIC ANEURYSM): ICD-10-CM

## 2025-02-04 DIAGNOSIS — M51.360 DEGENERATION OF INTERVERTEBRAL DISC OF LUMBAR REGION WITH DISCOGENIC BACK PAIN: ICD-10-CM

## 2025-02-04 DIAGNOSIS — E29.1 HYPOGONADISM MALE: ICD-10-CM

## 2025-02-04 DIAGNOSIS — E11.9 TYPE 2 DIABETES MELLITUS WITHOUT COMPLICATION, WITHOUT LONG-TERM CURRENT USE OF INSULIN (MULTI): Primary | ICD-10-CM

## 2025-02-04 PROCEDURE — 3077F SYST BP >= 140 MM HG: CPT | Performed by: FAMILY MEDICINE

## 2025-02-04 PROCEDURE — 1160F RVW MEDS BY RX/DR IN RCRD: CPT | Performed by: FAMILY MEDICINE

## 2025-02-04 PROCEDURE — G2211 COMPLEX E/M VISIT ADD ON: HCPCS | Performed by: FAMILY MEDICINE

## 2025-02-04 PROCEDURE — 3008F BODY MASS INDEX DOCD: CPT | Performed by: FAMILY MEDICINE

## 2025-02-04 PROCEDURE — 4010F ACE/ARB THERAPY RXD/TAKEN: CPT | Performed by: FAMILY MEDICINE

## 2025-02-04 PROCEDURE — 1123F ACP DISCUSS/DSCN MKR DOCD: CPT | Performed by: FAMILY MEDICINE

## 2025-02-04 PROCEDURE — 99214 OFFICE O/P EST MOD 30 MIN: CPT | Performed by: FAMILY MEDICINE

## 2025-02-04 PROCEDURE — 1159F MED LIST DOCD IN RCRD: CPT | Performed by: FAMILY MEDICINE

## 2025-02-04 PROCEDURE — 3079F DIAST BP 80-89 MM HG: CPT | Performed by: FAMILY MEDICINE

## 2025-02-04 RX ORDER — TESTOSTERONE CYPIONATE 200 MG/ML
200 INJECTION, SOLUTION INTRAMUSCULAR
Qty: 1 ML | Refills: 5 | Status: SHIPPED | OUTPATIENT
Start: 2025-02-04

## 2025-02-04 RX ORDER — SILDENAFIL 100 MG/1
100 TABLET, FILM COATED ORAL AS NEEDED
Qty: 6 TABLET | Refills: 0 | Status: SHIPPED | OUTPATIENT
Start: 2025-02-04 | End: 2025-02-10

## 2025-02-04 ASSESSMENT — ENCOUNTER SYMPTOMS
OCCASIONAL FEELINGS OF UNSTEADINESS: 0
LOSS OF SENSATION IN FEET: 0
DEPRESSION: 0

## 2025-02-04 NOTE — PROGRESS NOTES
"Subjective   Patient ID:  Arya Reid is a 69 y.o. male patient who presents today for Hypertension; Hyperlipidemia; Erectile Dysfunction; Hypogonadism; and Type 2 diabetes mellitus without complication, without long.    Hypertension: Blood pressure is elevated.     Hyperlipidemia: Cholesterol slightly elevated.     Erectile dysfunction: Doing well. Satisfactory.     Hypogonadism: Doing well. Good energy levels. Testosterone levels back in October were within normal limits.     Back pain: Patient continues to have back pain. He went through some physical therapy with no benefit. He had X-rays yesterday, report pending.     Diabetes: Most recent blood work shows blood glucose 145 mg/dl and  HbA1C 6.1%.     Patient is due for AAA screening. CT scan in 2019 revealed fatty liver disease.       Objective   Vitals:  /82   Pulse 82   Temp 36.3 °C (97.3 °F)   Resp 16   Ht 1.778 m (5' 10\")   Wt 66.5 kg (146 lb 9.6 oz)   SpO2 95%   BMI 21.03 kg/m²       Physical Exam  Vitals reviewed.   Constitutional:       Appearance: Normal appearance.   Neck:      Vascular: No carotid bruit.   Cardiovascular:      Rate and Rhythm: Normal rate and regular rhythm.      Pulses: Normal pulses.      Heart sounds: Normal heart sounds.   Pulmonary:      Effort: Pulmonary effort is normal. No respiratory distress.      Breath sounds: Normal breath sounds. No wheezing.   Abdominal:      General: There is no distension.      Palpations: Abdomen is soft. There is no mass.      Tenderness: There is no abdominal tenderness. There is no right CVA tenderness, left CVA tenderness, guarding or rebound.   Musculoskeletal:      Cervical back: Normal range of motion and neck supple. No rigidity.      Right lower leg: No edema.      Left lower leg: No edema.   Lymphadenopathy:      Cervical: No cervical adenopathy.   Neurological:      Mental Status: He is alert.         Labs reviewed from:  1/31/25 CMP, CBC, Lipid, HgA1C 6.1% "       Assessment/Plan   Problem List Items Addressed This Visit       Degenerative disc disease, lumbar    Relevant Orders    MR lumbar spine wo IV contrast    Hyperlipidemia    Current Assessment & Plan      Is stable, continue with current treatment.           Relevant Orders    CBC and Auto Differential    Lipid Panel    Comprehensive Metabolic Panel    Hypogonadism male    Current Assessment & Plan      Is stable, continue with current treatment.           Relevant Medications    testosterone cypionate (Depo-Testosterone) 200 mg/mL injection    Other Relevant Orders    Testosterone    Type 2 diabetes mellitus without complication, without long-term current use of insulin (Multi) - Primary    Current Assessment & Plan      Is stable, continue with current treatment.           Relevant Orders    Follow Up In [facility] - PCP - Established    Hemoglobin A1C    Albumin-Creatinine Ratio, Urine Random     Other Visit Diagnoses       Erectile dysfunction, unspecified erectile dysfunction type        Relevant Medications    sildenafil (Viagra) 100 mg tablet    Screening for AAA (abdominal aortic aneurysm)        Relevant Orders    Vascular US Abdominal Aorta Aneurysm AAA Screening              Follow up in: 6 month(s) for Wellness and follow-up or sooner if needed with labs prior.       Scribe Attestation  By signing my name below, IAudrey Scribcarlton attest that this documentation has been prepared under the direction and in the presence of Shahzad Bradley MD.

## 2025-02-05 LAB
TESTOST FREE SERPL-MCNC: 35.5 PG/ML (ref 35–155)
TESTOST SERPL-MCNC: 359 NG/DL (ref 250–1100)

## 2025-02-06 ENCOUNTER — HOSPITAL ENCOUNTER (OUTPATIENT)
Dept: RADIOLOGY | Facility: HOSPITAL | Age: 70
Discharge: HOME | End: 2025-02-06
Payer: MEDICARE

## 2025-02-06 DIAGNOSIS — Z13.6 SCREENING FOR AAA (ABDOMINAL AORTIC ANEURYSM): ICD-10-CM

## 2025-02-06 DIAGNOSIS — M51.360 DEGENERATION OF INTERVERTEBRAL DISC OF LUMBAR REGION WITH DISCOGENIC BACK PAIN: ICD-10-CM

## 2025-02-06 PROCEDURE — 72148 MRI LUMBAR SPINE W/O DYE: CPT

## 2025-02-06 PROCEDURE — 76706 US ABDL AORTA SCREEN AAA: CPT

## 2025-02-17 DIAGNOSIS — M51.360 DEGENERATION OF INTERVERTEBRAL DISC OF LUMBAR REGION WITH DISCOGENIC BACK PAIN: Primary | ICD-10-CM

## 2025-03-11 ENCOUNTER — OFFICE VISIT (OUTPATIENT)
Dept: ORTHOPEDIC SURGERY | Facility: CLINIC | Age: 70
End: 2025-03-11
Payer: MEDICARE

## 2025-03-11 ENCOUNTER — HOSPITAL ENCOUNTER (OUTPATIENT)
Dept: RADIOLOGY | Facility: CLINIC | Age: 70
Discharge: HOME | End: 2025-03-11
Payer: MEDICARE

## 2025-03-11 DIAGNOSIS — M54.50 LUMBAR PAIN: ICD-10-CM

## 2025-03-11 DIAGNOSIS — M54.16 LUMBAR RADICULOPATHY: ICD-10-CM

## 2025-03-11 PROCEDURE — 72100 X-RAY EXAM L-S SPINE 2/3 VWS: CPT

## 2025-03-11 PROCEDURE — 99214 OFFICE O/P EST MOD 30 MIN: CPT | Performed by: ORTHOPAEDIC SURGERY

## 2025-03-11 PROCEDURE — 1123F ACP DISCUSS/DSCN MKR DOCD: CPT | Performed by: ORTHOPAEDIC SURGERY

## 2025-03-11 PROCEDURE — 1159F MED LIST DOCD IN RCRD: CPT | Performed by: ORTHOPAEDIC SURGERY

## 2025-03-11 PROCEDURE — 99215 OFFICE O/P EST HI 40 MIN: CPT | Performed by: ORTHOPAEDIC SURGERY

## 2025-03-11 PROCEDURE — 4010F ACE/ARB THERAPY RXD/TAKEN: CPT | Performed by: ORTHOPAEDIC SURGERY

## 2025-03-11 NOTE — PROGRESS NOTES
Arya Reid is a 69 y.o. male who presents for low back pain.    HPI:  69-year-old gentleman here for new patient evaluation of low back pain.  He denies any fever chills nausea vomiting night sweats.  He has no bowel or bladder complaints.  This is a patient known to Dr. Ashley.    Physical exam:  Well-nourished, well kept.No lymphangitis or lymphadenopathy in the examined extremities.  Gait normal.  Can stand on heels and toes.   Examination of the back shows tenderness in the paraspinous musculature.  There is decreased range of motion in all directions due to guarding/muscle spasms and pain at extremes.  There is good strength and no instability.  Examination of the lower extremities reveals no point tenderness, swelling, or deformity.  Range of motion of the hips, knees, and ankles are full without crepitance, instability, or exacerbation of pain, except he is mildly tender over both greater trochanteric bursa's.  Strength is 5/5 throughout.  No redness, abrasions, or lesions on extremities  Gross sensation intact in the extremities.  Deep tendon reflexes absent bilateral patella.  Clonus negative.  Affect normal.  Alert and oriented ×3.  Coordination normal.    Imaging studies:  We ordered flexion-extension x-rays of the lumbar spine.  We have reviewed x-rays of the lumbar spine from February 3, 2025, we reviewed an MRI of the lumbar spine from February 6, 2025.    Assessment:  69-year-old gentleman here for new patient evaluation of low back pain bilateral leg symptoms.  This has been bothering him for about 7 months.  His chief complaint is his legs bilateral buttock and hamstring pain right greater than left.  The pain never goes below the knee into the ankles shins or calf or feet.  The back pain is minimal.  Most of his leg issues are in the hamstring and buttock.  He is not describing any significant claudication symptoms except on rare occasions.  He feels like he can walk but it is the  buttock and hamstring pain that gets bad.  He did physical therapy recently he did not think he got any significant benefit from it.  No chiropractic history.  No epidural injections, no lumbar surgery.  He has had a previous cervical surgery with Dr. Ashley in 2018 he is doing well with that.  He was sent over by Dr. Bradley.  His MRI shows several levels in his lumbar spine with moderate to severe stenosis, he has a right facet cyst at L3-4 with severe central stenosis at that level, he also has severe stenosis centrally at L4-5.    We have ordered and reviewed tests, x-rays x 2, MRI.  We reviewed the notes from Dr. Bradley from February 4, 2025.  This is an exacerbation of a chronic problem that is affecting his bodily function.    For complete plan and/or surgical details, please refer to Dr. Ashley's portion of this split dictation.    -Colten Washington PA-C    In a face-to-face encounter, I performed a history and physical examination, discussed pertinent diagnostic studies if indicated, and discussed diagnosis and management strategies with both the patient and the midlevel provider.  I reviewed the midlevel's note and agree with the documented findings and plan of care.    Patient with buttock hamstring pain and heaviness in his legs a month or so ago but that seems to have resolved.  He had a long history there was having difficulty walking any sort of distances.  He says he normally walks his dog 2 or 3 miles a day but then he went for a period where he could not walk the dog at all because he can only go about 100 yards.  That has resolved on its own.  I reviewed x-rays and we have ordered and reviewed x-rays today and have also reviewed his MRI.  MRI shows moderate central stenosis at L2-3 severe central stenosis at L3-4 with left-sided foraminal stenosis at that level.  There is also a facet cyst centrally at that level.  At L4-5 there is severe central stenosis and bilateral foraminal stenosis  and L5-S1 there is bilateral foraminal stenosis.    Assessment/plan: Patient with above described imaging studies.  I explained to him that at this point his symptoms have improved significantly and I do not think he needs to have any significant surgery.  We did discuss to consider a laminectomy on him because of how bad his symptoms were and how tight his stenosis is.  He is having no bowel or bladder changes.  And currently he is functioning okay where he said he walked his dog 2 miles the other day.  He does have a severe threat to intervention of bodily function given how bad his symptoms was and how bad his MRI findings are.  At this point organ to get him into some chiropractic and we will see how he does.  If he continues to do well he will continue with activities as tolerated.  If symptoms return he will come back and find me.    Michael Ashley MD  Orthopedic surgery

## 2025-03-13 DIAGNOSIS — E11.9 TYPE 2 DIABETES MELLITUS WITHOUT COMPLICATION, WITHOUT LONG-TERM CURRENT USE OF INSULIN (MULTI): ICD-10-CM

## 2025-03-13 RX ORDER — METFORMIN HYDROCHLORIDE 1000 MG/1
1000 TABLET ORAL
Qty: 60 TABLET | Refills: 5 | Status: SHIPPED | OUTPATIENT
Start: 2025-03-13 | End: 2025-09-09

## 2025-03-13 NOTE — TELEPHONE ENCOUNTER
Rx Refill Request Telephone Encounter    Name:  Arya Reid  :  [id number]  Medication Name:  metFORMIN (Glucophage) 1,000 mg tablet     Specific Pharmacy location:  [facility]   Date of last appointment:  25  Date of next appointment:  25  Best number to reach patient:  [phone]

## 2025-06-04 ENCOUNTER — TELEPHONE (OUTPATIENT)
Dept: PRIMARY CARE | Facility: CLINIC | Age: 70
End: 2025-06-04
Payer: MEDICARE

## 2025-06-04 NOTE — TELEPHONE ENCOUNTER
Rx Refill Request     Name: Arya Reid  :  1955   Medication Name:  gabapentin   Specific Pharmacy location:  [facility]   Date of last appointment:  Visit date not found   Date of next appointment:  Visit date not found   Best number to reach patient:  [phone]

## 2025-06-06 DIAGNOSIS — G89.29 CHRONIC BILATERAL LOW BACK PAIN WITHOUT SCIATICA: ICD-10-CM

## 2025-06-06 DIAGNOSIS — M54.50 CHRONIC BILATERAL LOW BACK PAIN WITHOUT SCIATICA: ICD-10-CM

## 2025-06-06 DIAGNOSIS — E11.9 TYPE 2 DIABETES MELLITUS WITHOUT COMPLICATION, WITHOUT LONG-TERM CURRENT USE OF INSULIN: Primary | ICD-10-CM

## 2025-06-06 RX ORDER — GABAPENTIN 100 MG/1
100 CAPSULE ORAL 3 TIMES DAILY
Qty: 90 CAPSULE | Refills: 2 | Status: SHIPPED | OUTPATIENT
Start: 2025-06-06 | End: 2025-09-04

## 2025-06-06 NOTE — TELEPHONE ENCOUNTER
Rx Refill Request     Name: Arya Reid  :  1955   Medication Name:    gabapentin (Neurontin) 100 mg capsule    Last fill: 2025 30 day supply Q 90 R 0  Specific Pharmacy location:  [facility] [place]  Date of last appointment:  2025  Date of next appointment:  2025  Best number to reach patient:  [phone]       RX PENDED to [facility] as directed by FAX

## 2025-06-20 ENCOUNTER — APPOINTMENT (OUTPATIENT)
Dept: DERMATOLOGY | Facility: CLINIC | Age: 70
End: 2025-06-20
Payer: MEDICARE

## 2025-06-20 DIAGNOSIS — Z12.83 ENCOUNTER FOR SCREENING FOR MALIGNANT NEOPLASM OF SKIN: ICD-10-CM

## 2025-06-20 DIAGNOSIS — D22.71 MELANOCYTIC NEVI OF RIGHT LOWER LIMB, INCLUDING HIP: ICD-10-CM

## 2025-06-20 DIAGNOSIS — D22.60 MELANOCYTIC NEVI OF UNSPECIFIED UPPER LIMB, INCLUDING SHOULDER: ICD-10-CM

## 2025-06-20 DIAGNOSIS — L57.8 PHOTOAGING OF SKIN: Primary | ICD-10-CM

## 2025-06-20 DIAGNOSIS — D22.5 MELANOCYTIC NEVI OF TRUNK: ICD-10-CM

## 2025-06-20 DIAGNOSIS — L28.0 LICHEN SIMPLEX CHRONICUS: ICD-10-CM

## 2025-06-20 DIAGNOSIS — L81.4 LENTIGO: ICD-10-CM

## 2025-06-20 DIAGNOSIS — D22.72 MELANOCYTIC NEVI OF LEFT LOWER EXTREMITY OR HIP: ICD-10-CM

## 2025-06-20 DIAGNOSIS — L82.1 SEBORRHEIC KERATOSIS: ICD-10-CM

## 2025-06-20 DIAGNOSIS — D18.01 HEMANGIOMA OF SKIN: ICD-10-CM

## 2025-06-20 PROCEDURE — 4010F ACE/ARB THERAPY RXD/TAKEN: CPT | Performed by: DERMATOLOGY

## 2025-06-20 PROCEDURE — 99214 OFFICE O/P EST MOD 30 MIN: CPT | Performed by: DERMATOLOGY

## 2025-06-20 PROCEDURE — 1159F MED LIST DOCD IN RCRD: CPT | Performed by: DERMATOLOGY

## 2025-06-20 RX ORDER — TRIAMCINOLONE ACETONIDE 1 MG/G
CREAM TOPICAL
Qty: 80 G | Refills: 2 | Status: SHIPPED | OUTPATIENT
Start: 2025-06-20

## 2025-06-20 ASSESSMENT — DERMATOLOGY QUALITY OF LIFE (QOL) ASSESSMENT
RATE HOW EMOTIONALLY BOTHERED YOU ARE BY YOUR SKIN PROBLEM (FOR EXAMPLE, WORRY, EMBARRASSMENT, FRUSTRATION): 0 - NEVER BOTHERED
RATE HOW BOTHERED YOU ARE BY SYMPTOMS OF YOUR SKIN PROBLEM (EG, ITCHING, STINGING BURNING, HURTING OR SKIN IRRITATION): 1
RATE HOW EMOTIONALLY BOTHERED YOU ARE BY YOUR SKIN PROBLEM (FOR EXAMPLE, WORRY, EMBARRASSMENT, FRUSTRATION): 0 - NEVER BOTHERED
RATE HOW BOTHERED YOU ARE BY EFFECTS OF YOUR SKIN PROBLEMS ON YOUR ACTIVITIES (EG, GOING OUT, ACCOMPLISHING WHAT YOU WANT, WORK ACTIVITIES OR YOUR RELATIONSHIPS WITH OTHERS): 0 - NEVER BOTHERED
WHAT SINGLE SKIN CONDITION LISTED BELOW IS THE PATIENT ANSWERING THE QUALITY-OF-LIFE ASSESSMENT QUESTIONS ABOUT: NONE OF THE ABOVE
RATE HOW BOTHERED YOU ARE BY EFFECTS OF YOUR SKIN PROBLEMS ON YOUR ACTIVITIES (EG, GOING OUT, ACCOMPLISHING WHAT YOU WANT, WORK ACTIVITIES OR YOUR RELATIONSHIPS WITH OTHERS): 0 - NEVER BOTHERED
RATE HOW BOTHERED YOU ARE BY SYMPTOMS OF YOUR SKIN PROBLEM (EG, ITCHING, STINGING BURNING, HURTING OR SKIN IRRITATION): 1
WHAT SINGLE SKIN CONDITION LISTED BELOW IS THE PATIENT ANSWERING THE QUALITY-OF-LIFE ASSESSMENT QUESTIONS ABOUT: NONE OF THE ABOVE
DATE THE QUALITY-OF-LIFE ASSESSMENT WAS COMPLETED: 67376

## 2025-06-20 ASSESSMENT — PATIENT GLOBAL ASSESSMENT (PGA): WHAT IS THE PGA: PATIENT GLOBAL ASSESSMENT:  2 - MILD

## 2025-06-20 NOTE — Clinical Note
Lichen simplex chronicus is a chronic form of neurodermatitis where chronic, repetitive itching, rubbing and/or scratching result in thickening of the skin.  Treatments include reducing itch, which is most frequently done with topical corticosteroid creams or intralesional kenalog (ILK).    Resume triamcinolone 0.1% cream as previously prescribed for current flare. Patient to apply 2x daily x 2 wks then decrease to 2x/day 2 days per week. Can repeat full 2 week course as often as every 6-8 weeks as needed for flaring. Side effects of topical steroids includes, but is not limited to skin atrophy and dyspigmentation.    Contact office if worsening or not improving.

## 2025-06-20 NOTE — PROGRESS NOTES
Subjective     Arya Reid is a 69 y.o. male who presents for the following: Skin Check (6 month - LV 12/19/24 - Patient has a history of: AK, SK  Area(s) of concern: none).     Intake Questions  Do you have any new or changing Lesions?: (Patient-Rptd) (P) No  Are you an organ transplant recipient?: (Patient-Rptd) (P) No  Have you had or do you have a Staph Infection?: (Patient-Rptd) (P) No  Have you had or do you have Vacular Disease?: (Patient-Rptd) (P) No  Do you use sunscreen?: (Patient-Rptd) (P) None  Do you use a tanning bed?: (Patient-Rptd) (P) No    Review of Systems:  No other skin or systemic complaints other than what is documented elsewhere in the note.    The following portions of the chart were reviewed this encounter and updated as appropriate:         Skin Cancer History  Biopsy Log Book  No skin cancers from Specimen Tracking.    Additional History      Specialty Problems          Dermatology Problems    Actinic keratosis    Hemangioma of skin and subcutaneous tissue    Lichen simplex chronicus    Dermatitis seborrheica    Skin lesion of scalp        Objective   Well appearing patient in no apparent distress; mood and affect are within normal limits.    A full examination was performed including scalp, head, eyes, ears, nose, lips, neck, chest, axillae, abdomen, back, buttocks, bilateral upper extremities, bilateral lower extremities, hands, feet, fingers, toes, fingernails, and toenails. All findings within normal limits unless otherwise noted below.    Assessment/Plan   Skin Exam  1. PHOTOAGING OF SKIN  Generalized  Mottled pigmentation with telangiectasias and brown reticular macules in sun exposed areas of the body.  The risk of chronic, cumulative sun damage and risk of development of skin cancer was reviewed today.   The importance of sun protection was reviewed: including the use of a broad spectrum sunscreen that protects against both UVA/UVB rays, with ingredients such as Zinc  oxide or titanium dioxide, wearing sun protective clothing and sun avoidance. We reviewed the warning signs of non-melanoma skin cancer and ABCDEs of melanoma  Please follow up should you notice any new or changing pre-existing skin lesion.  Related Procedures  Follow Up In Dermatology - Established Patient  2. LENTIGO  Generalized  Scattered tan macules in sun-exposed areas.  These are benign skin lesions due to sun exposure. They will darken in response to sun exposure. They should be monitored for change in size, shape or color.  These lesions can be treated cosmetically with topical creams, liquid nitrogen and a variety of lasers.  3. HEMANGIOMA OF SKIN  Generalized  Cherry red papules  The benign nature of these skin lesions were reviewed, no treatment is necessary.   Please follow up for any new or pre-existing lesion that is changing in size, shape, color, becomes painful, tender, itches or bleed.  4. MELANOCYTIC NEVI OF UNSPECIFIED UPPER LIMB, INCLUDING SHOULDER  Generalized  Scattered, uniform and benign-appearing, regular brown melanocytic papules and macules.  Clinically benign appearing nevi, no treatment is necessary.  The importance of sun protection was reviewed: including the use of a broad spectrum sunscreen that protects against both UVA/UVB rays, with ingredients such as Zinc oxide or titanium dioxide, wearing sun protective clothing and sun avoidance.   ABCDEs of melanoma reviewed.  Please follow up should you notice any new or changing pre-existing skin lesion.  5. MELANOCYTIC NEVI OF LEFT LOWER EXTREMITY OR HIP  Generalized  Scattered, uniform and benign-appearing, regular brown melanocytic papules and macules.  Clinically benign appearing nevi, no treatment is necessary.  The importance of sun protection was reviewed: including the use of a broad spectrum sunscreen that protects against both UVA/UVB rays, with ingredients such as Zinc oxide or titanium dioxide, wearing sun protective clothing  and sun avoidance.   ABCDEs of melanoma reviewed.  Please follow up should you notice any new or changing pre-existing skin lesion.  6. MELANOCYTIC NEVI OF TRUNK  Generalized  Tan-brown symmetric macules and papules  Clinically benign appearing nevi, no treatment is necessary.  The importance of sun protection was reviewed: including the use of a broad spectrum sunscreen that protects against both UVA/UVB rays, with ingredients such as Zinc oxide or titanium dioxide, wearing sun protective clothing and sun avoidance.   ABCDEs of melanoma reviewed.  Please follow up should you notice any new or changing pre-existing skin lesion.  7. MELANOCYTIC NEVI OF RIGHT LOWER LIMB, INCLUDING HIP  Generalized  Scattered, uniform and benign-appearing, regular brown melanocytic papules and macules.  Clinically benign appearing nevi, no treatment is necessary.  The importance of sun protection was reviewed: including the use of a broad spectrum sunscreen that protects against both UVA/UVB rays, with ingredients such as Zinc oxide or titanium dioxide, wearing sun protective clothing and sun avoidance.   ABCDEs of melanoma reviewed.  Please follow up should you notice any new or changing pre-existing skin lesion.  8. SEBORRHEIC KERATOSIS  Generalized  Brown, tan waxy macules and stuck on appearing papules and plaques  The benign nature of these skin lesions reviewed, reassure provided and no further treatment needed at this time.   These lesions can be removed, if symptomatic (itching, bleeding, rubbing on clothing, painful), otherwise removal is considered cosmetic.   9. LICHEN SIMPLEX CHRONICUS  Right Buttock  Scaly hyperpigmented thin plaque  Lichen simplex chronicus is a chronic form of neurodermatitis where chronic, repetitive itching, rubbing and/or scratching result in thickening of the skin.  Treatments include reducing itch, which is most frequently done with topical corticosteroid creams or intralesional kenalog  (ILK).    Resume triamcinolone 0.1% cream as previously prescribed for current flare. Patient to apply 2x daily x 2 wks then decrease to 2x/day 2 days per week. Can repeat full 2 week course as often as every 6-8 weeks as needed for flaring. Side effects of topical steroids includes, but is not limited to skin atrophy and dyspigmentation.    Contact office if worsening or not improving.   triamcinolone (Kenalog) 0.1 % cream - Right Buttock  Apply to affected area on the buttocks 2x daily x 2 weeks then 2x daily 2 days per week if needed  10. ENCOUNTER FOR SCREENING FOR MALIGNANT NEOPLASM OF SKIN  Generalized  No suspicious lesions noted on examination today  The risk of chronic, cumulative sun damage and risk of development of skin cancer was reviewed today.   The importance of sun protection was reviewed: including the use of a broad spectrum sunscreen of at least SPF 30 that protects against both UVA/UVB rays, with ingredients such as Zinc oxide or titanium dioxide, wearing sun protective clothing and sun avoidance. We reviewed the warning signs of non-melanoma skin cancer and ABCDEs of melanoma  Please follow up should you notice any new or changing pre-existing skin lesion.    Follow up in 1 year or sooner if needed.

## 2025-06-30 NOTE — PROGRESS NOTES
Subjective   Patient ID:  Arya Reid is a 69 y.o. male patient who presents today for Medicare Annual Wellness Visit Subsequent, Diabetes, Hypertension, Hyperlipidemia, Hypothyroidism, and Back Pain.    Encounter for subsequent AWV: Medicare wellness visit done, patient is in satisfactory living circumstances where the patient's needs are met.  This patient is advised to develop or update their living will and provide us a copy for the chart.     Hypertension: Patient's blood pressures are well controlled on current medication. His reading in the office today is 138/74.    Hyperlipidemia: Patient's lipid levels are mildly elevated from his last lab results. He is doing well on current medications.    Erectile dysfunction & Hypogonadism: Patient is doing well with sildenafil and testosterone shots.    Diabetes: Patient sugar levels are well controlled with current medications. He says they are in the range of 100s. His last glucose level is 145 and A1c is 6.1%    Patient mentions noticing left hip pain after getting his testosterone shots. He says the pain is aggravated after driving. He went to urgent care clinic and got treatment. Currently he has no pain today.    The patients living will is active. His POA is Whit, back-up POA is Timothy.  The patients current code status is FULL CODE. The patient does not want to linger on machines. .     Patient is due for prostate cancer screening.    Review of Systems   Constitutional:  Negative for chills, diaphoresis and fever.   HENT:  Negative for congestion, ear pain, hearing loss, sinus pressure and sinus pain.    Eyes:  Negative for pain and visual disturbance.   Respiratory:  Negative for chest tightness and shortness of breath.    Cardiovascular:  Negative for chest pain and leg swelling.   Gastrointestinal:  Negative for abdominal pain, blood in stool, constipation, diarrhea, nausea and vomiting.   Genitourinary:  Negative for dysuria, frequency and  "urgency.   Musculoskeletal:  Negative for arthralgias, myalgias and neck pain.   Neurological:  Negative for headaches.         Objective   Vitals:  /74 (BP Location: Right arm, Patient Position: Sitting, BP Cuff Size: Adult)   Pulse 83   Temp 36.5 °C (97.7 °F)   Resp 14   Ht 1.778 m (5' 10\")   Wt 66.6 kg (146 lb 12.8 oz)   SpO2 94%   BMI 21.06 kg/m²       Physical Exam  Vitals reviewed.   Constitutional:       Appearance: Normal appearance.   Neck:      Vascular: No carotid bruit.     Cardiovascular:      Rate and Rhythm: Normal rate and regular rhythm.      Pulses: Normal pulses.      Heart sounds: Normal heart sounds.   Pulmonary:      Effort: Pulmonary effort is normal. No respiratory distress.      Breath sounds: Normal breath sounds. No wheezing.   Abdominal:      General: There is no distension.      Palpations: Abdomen is soft. There is no mass.      Tenderness: There is no abdominal tenderness. There is no right CVA tenderness, left CVA tenderness, guarding or rebound.     Musculoskeletal:      Cervical back: Normal range of motion and neck supple. No rigidity.      Right lower leg: No edema.      Left lower leg: No edema.   Lymphadenopathy:      Cervical: No cervical adenopathy.     Neurological:      Mental Status: He is alert.         Labs reviewed from:     01/31/2025         CMP, CBC, Lipid, HgA1C 6.1 %     Lab Results   Component Value Date    ALBUMIN 5.0 01/31/2025    ALT 15 01/31/2025    AST 16 01/31/2025    BUN 20 01/31/2025    CALCIUM 9.9 01/31/2025    CL 97 (L) 01/31/2025    CHOL 204 (H) 01/31/2025    CO2 27 01/31/2025    CREATININE 0.65 (L) 01/31/2025    EGFR 103 01/31/2025    GLUCOSE 145 (H) 01/31/2025    HDL 84 01/31/2025    HCT 42.2 01/31/2025    HGB 14.3 01/31/2025    HGBA1C 6.1 (H) 01/31/2025    K 4.7 01/31/2025    LDLCALC 104 (H) 01/31/2025     01/31/2025     01/31/2025    PSASCREEN 0.48 11/02/2022    TRIG 75 01/31/2025    TSH 2.22 04/22/2024    WBC 5.4 01/31/2025 "          Assessment/Plan   Problem List Items Addressed This Visit       Chronic cervical radiculopathy    Relevant Medications    ibuprofen 800 mg tablet    baclofen (Lioresal) 10 mg tablet    HTN (hypertension), benign - Primary    Current Assessment & Plan   This condition is well controlled, continue with current medications.          Relevant Orders    CBC and Auto Differential    Comprehensive Metabolic Panel    Hyperlipidemia    Current Assessment & Plan   This condition is well controlled, continue with current medications.          Relevant Medications    sildenafil (Viagra) 100 mg tablet    Other Relevant Orders    Lipid Panel    Hypogonadism male    Relevant Orders    Testosterone    Type 2 diabetes mellitus without complication, without long-term current use of insulin    Current Assessment & Plan   This condition is well controlled, continue with current medications.          Relevant Medications    metFORMIN (Glucophage) 1,000 mg tablet    Other Relevant Orders    Hemoglobin A1C    Albumin-Creatinine Ratio, Urine Random    Follow Up In [facility] - PCP - Established    Medicare annual wellness visit, subsequent    Current Assessment & Plan   Medicare wellness visit done, patient is in satisfactory living circumstances where the patient's needs are met. This patient is advised to develop or update their living will and provide us a copy for the chart.          Other Visit Diagnoses         Erectile dysfunction, unspecified erectile dysfunction type        Relevant Medications    sildenafil (Viagra) 100 mg tablet      Screening for prostate cancer        Relevant Orders    Prostate Specific Antigen, Screen          Follow up in: 6 month(s) for follow-up of the above issues or sooner if needed with labs today.     Scribe Attestation  By signing my name below, Anaya ROWE Scribe   attest that this documentation has been prepared under the direction and in the presence of Shahzad Bradley  MD.    I, Shahzad Bradley MD, personally performed the services described in the documentation as scribed by Anaya Lovelace in my presence and confirm that it is both accurate and complete.

## 2025-07-19 DIAGNOSIS — E78.2 MIXED HYPERLIPIDEMIA: ICD-10-CM

## 2025-07-19 DIAGNOSIS — E11.9 TYPE 2 DIABETES MELLITUS WITHOUT COMPLICATION, WITHOUT LONG-TERM CURRENT USE OF INSULIN: ICD-10-CM

## 2025-08-05 ENCOUNTER — APPOINTMENT (OUTPATIENT)
Dept: PRIMARY CARE | Facility: CLINIC | Age: 70
End: 2025-08-05
Payer: MEDICARE

## 2025-08-05 VITALS
OXYGEN SATURATION: 94 % | DIASTOLIC BLOOD PRESSURE: 74 MMHG | HEIGHT: 70 IN | WEIGHT: 146.8 LBS | BODY MASS INDEX: 21.02 KG/M2 | TEMPERATURE: 97.7 F | RESPIRATION RATE: 14 BRPM | SYSTOLIC BLOOD PRESSURE: 138 MMHG | HEART RATE: 83 BPM

## 2025-08-05 DIAGNOSIS — I10 HTN (HYPERTENSION), BENIGN: ICD-10-CM

## 2025-08-05 DIAGNOSIS — M54.12 CHRONIC CERVICAL RADICULOPATHY: ICD-10-CM

## 2025-08-05 DIAGNOSIS — E29.1 HYPOGONADISM MALE: ICD-10-CM

## 2025-08-05 DIAGNOSIS — Z00.00 MEDICARE ANNUAL WELLNESS VISIT, SUBSEQUENT: ICD-10-CM

## 2025-08-05 DIAGNOSIS — N52.9 ERECTILE DYSFUNCTION, UNSPECIFIED ERECTILE DYSFUNCTION TYPE: ICD-10-CM

## 2025-08-05 DIAGNOSIS — Z00.00 ROUTINE GENERAL MEDICAL EXAMINATION AT HEALTH CARE FACILITY: Primary | ICD-10-CM

## 2025-08-05 DIAGNOSIS — Z12.5 SCREENING FOR PROSTATE CANCER: ICD-10-CM

## 2025-08-05 DIAGNOSIS — E11.9 TYPE 2 DIABETES MELLITUS WITHOUT COMPLICATION, WITHOUT LONG-TERM CURRENT USE OF INSULIN: ICD-10-CM

## 2025-08-05 DIAGNOSIS — E78.2 MIXED HYPERLIPIDEMIA: ICD-10-CM

## 2025-08-05 LAB — PSA SERPL-MCNC: 0.45 NG/ML

## 2025-08-05 PROCEDURE — 1160F RVW MEDS BY RX/DR IN RCRD: CPT | Performed by: FAMILY MEDICINE

## 2025-08-05 PROCEDURE — 3078F DIAST BP <80 MM HG: CPT | Performed by: FAMILY MEDICINE

## 2025-08-05 PROCEDURE — 99214 OFFICE O/P EST MOD 30 MIN: CPT | Performed by: FAMILY MEDICINE

## 2025-08-05 PROCEDURE — G0439 PPPS, SUBSEQ VISIT: HCPCS | Performed by: FAMILY MEDICINE

## 2025-08-05 PROCEDURE — 3008F BODY MASS INDEX DOCD: CPT | Performed by: FAMILY MEDICINE

## 2025-08-05 PROCEDURE — 1158F ADVNC CARE PLAN TLK DOCD: CPT | Performed by: FAMILY MEDICINE

## 2025-08-05 PROCEDURE — 1170F FXNL STATUS ASSESSED: CPT | Performed by: FAMILY MEDICINE

## 2025-08-05 PROCEDURE — 1159F MED LIST DOCD IN RCRD: CPT | Performed by: FAMILY MEDICINE

## 2025-08-05 PROCEDURE — 3075F SYST BP GE 130 - 139MM HG: CPT | Performed by: FAMILY MEDICINE

## 2025-08-05 PROCEDURE — 4010F ACE/ARB THERAPY RXD/TAKEN: CPT | Performed by: FAMILY MEDICINE

## 2025-08-05 RX ORDER — SILDENAFIL 100 MG/1
100 TABLET, FILM COATED ORAL AS NEEDED
Qty: 6 TABLET | Refills: 0 | Status: SHIPPED | OUTPATIENT
Start: 2025-08-05 | End: 2025-08-11

## 2025-08-05 RX ORDER — IBUPROFEN 800 MG/1
800 TABLET, FILM COATED ORAL EVERY 8 HOURS PRN
Qty: 180 TABLET | Refills: 5 | Status: SHIPPED | OUTPATIENT
Start: 2025-08-05

## 2025-08-05 RX ORDER — BACLOFEN 10 MG/1
10 TABLET ORAL 2 TIMES DAILY
Qty: 180 TABLET | Refills: 1 | Status: SHIPPED | OUTPATIENT
Start: 2025-08-05

## 2025-08-05 RX ORDER — METFORMIN HYDROCHLORIDE 1000 MG/1
1000 TABLET ORAL
Qty: 60 TABLET | Refills: 5 | Status: SHIPPED | OUTPATIENT
Start: 2025-08-05 | End: 2026-02-01

## 2025-08-05 ASSESSMENT — ACTIVITIES OF DAILY LIVING (ADL)
DOING_HOUSEWORK: INDEPENDENT
GROCERY_SHOPPING: INDEPENDENT
MANAGING_FINANCES: INDEPENDENT
BATHING: INDEPENDENT
DRESSING: INDEPENDENT
TAKING_MEDICATION: INDEPENDENT

## 2025-08-05 ASSESSMENT — ENCOUNTER SYMPTOMS
CHEST TIGHTNESS: 0
CONSTIPATION: 0
SINUS PRESSURE: 0
ABDOMINAL PAIN: 0
OCCASIONAL FEELINGS OF UNSTEADINESS: 0
FEVER: 0
ARTHRALGIAS: 0
CHILLS: 0
DIARRHEA: 0
DEPRESSION: 0
EYE PAIN: 0
SHORTNESS OF BREATH: 0
NAUSEA: 0
FREQUENCY: 0
NECK PAIN: 0
BLOOD IN STOOL: 0
DIAPHORESIS: 0
HEADACHES: 0
VOMITING: 0
LOSS OF SENSATION IN FEET: 0
MYALGIAS: 0
DYSURIA: 0
SINUS PAIN: 0

## 2025-08-05 ASSESSMENT — PATIENT HEALTH QUESTIONNAIRE - PHQ9
SUM OF ALL RESPONSES TO PHQ9 QUESTIONS 1 AND 2: 0
2. FEELING DOWN, DEPRESSED OR HOPELESS: NOT AT ALL
1. LITTLE INTEREST OR PLEASURE IN DOING THINGS: NOT AT ALL

## 2025-08-05 NOTE — ASSESSMENT & PLAN NOTE
Medicare wellness visit done, patient is in satisfactory living circumstances where the patient's needs are met. This patient is advised to develop or update their living will and provide us a copy for the chart.

## 2025-08-06 LAB
ALBUMIN SERPL-MCNC: 4.8 G/DL (ref 3.6–5.1)
ALBUMIN/CREAT UR: 31 MG/G CREAT
ALP SERPL-CCNC: 63 U/L (ref 35–144)
ALT SERPL-CCNC: 14 U/L (ref 9–46)
ANION GAP SERPL CALCULATED.4IONS-SCNC: 9 MMOL/L (CALC) (ref 7–17)
AST SERPL-CCNC: 14 U/L (ref 10–35)
BASOPHILS # BLD AUTO: 91 CELLS/UL (ref 0–200)
BASOPHILS NFR BLD AUTO: 1.9 %
BILIRUB SERPL-MCNC: 0.4 MG/DL (ref 0.2–1.2)
BUN SERPL-MCNC: 20 MG/DL (ref 7–25)
CALCIUM SERPL-MCNC: 9.6 MG/DL (ref 8.6–10.3)
CHLORIDE SERPL-SCNC: 96 MMOL/L (ref 98–110)
CHOLEST SERPL-MCNC: 179 MG/DL
CHOLEST/HDLC SERPL: 2.2 (CALC)
CO2 SERPL-SCNC: 29 MMOL/L (ref 20–32)
CREAT SERPL-MCNC: 0.54 MG/DL (ref 0.7–1.35)
CREAT UR-MCNC: 62 MG/DL (ref 20–320)
EGFRCR SERPLBLD CKD-EPI 2021: 109 ML/MIN/1.73M2
EOSINOPHIL # BLD AUTO: 283 CELLS/UL (ref 15–500)
EOSINOPHIL NFR BLD AUTO: 5.9 %
ERYTHROCYTE [DISTWIDTH] IN BLOOD BY AUTOMATED COUNT: 14.1 % (ref 11–15)
EST. AVERAGE GLUCOSE BLD GHB EST-MCNC: 131 MG/DL
EST. AVERAGE GLUCOSE BLD GHB EST-SCNC: 7.3 MMOL/L
GLUCOSE SERPL-MCNC: 154 MG/DL (ref 65–99)
HBA1C MFR BLD: 6.2 %
HCT VFR BLD AUTO: 40.4 % (ref 38.5–50)
HDLC SERPL-MCNC: 82 MG/DL
HGB BLD-MCNC: 12.7 G/DL (ref 13.2–17.1)
LDLC SERPL CALC-MCNC: 84 MG/DL (CALC)
LYMPHOCYTES # BLD AUTO: 1181 CELLS/UL (ref 850–3900)
LYMPHOCYTES NFR BLD AUTO: 24.6 %
MCH RBC QN AUTO: 32.6 PG (ref 27–33)
MCHC RBC AUTO-ENTMCNC: 31.4 G/DL (ref 32–36)
MCV RBC AUTO: 103.6 FL (ref 80–100)
MICROALBUMIN UR-MCNC: 1.9 MG/DL
MONOCYTES # BLD AUTO: 547 CELLS/UL (ref 200–950)
MONOCYTES NFR BLD AUTO: 11.4 %
NEUTROPHILS # BLD AUTO: 2698 CELLS/UL (ref 1500–7800)
NEUTROPHILS NFR BLD AUTO: 56.2 %
NONHDLC SERPL-MCNC: 97 MG/DL (CALC)
PLATELET # BLD AUTO: 216 THOUSAND/UL (ref 140–400)
PMV BLD REES-ECKER: 10.7 FL (ref 7.5–12.5)
POTASSIUM SERPL-SCNC: 4.5 MMOL/L (ref 3.5–5.3)
PROT SERPL-MCNC: 6.8 G/DL (ref 6.1–8.1)
RBC # BLD AUTO: 3.9 MILLION/UL (ref 4.2–5.8)
SODIUM SERPL-SCNC: 134 MMOL/L (ref 135–146)
TESTOST SERPL-MCNC: 291 NG/DL (ref 250–827)
TRIGL SERPL-MCNC: 53 MG/DL
WBC # BLD AUTO: 4.8 THOUSAND/UL (ref 3.8–10.8)

## 2026-02-06 ENCOUNTER — APPOINTMENT (OUTPATIENT)
Dept: PRIMARY CARE | Facility: CLINIC | Age: 71
End: 2026-02-06
Payer: MEDICARE

## 2026-06-23 ENCOUNTER — APPOINTMENT (OUTPATIENT)
Dept: DERMATOLOGY | Facility: CLINIC | Age: 71
End: 2026-06-23
Payer: MEDICARE